# Patient Record
Sex: FEMALE | Race: WHITE | NOT HISPANIC OR LATINO | Employment: PART TIME | URBAN - METROPOLITAN AREA
[De-identification: names, ages, dates, MRNs, and addresses within clinical notes are randomized per-mention and may not be internally consistent; named-entity substitution may affect disease eponyms.]

---

## 2019-11-01 ENCOUNTER — OFFICE VISIT (OUTPATIENT)
Dept: FAMILY MEDICINE CLINIC | Facility: CLINIC | Age: 54
End: 2019-11-01

## 2019-11-01 VITALS
OXYGEN SATURATION: 99 % | HEIGHT: 62 IN | DIASTOLIC BLOOD PRESSURE: 70 MMHG | SYSTOLIC BLOOD PRESSURE: 100 MMHG | WEIGHT: 215 LBS | BODY MASS INDEX: 39.56 KG/M2 | TEMPERATURE: 98.9 F | HEART RATE: 84 BPM

## 2019-11-01 DIAGNOSIS — E66.01 CLASS 2 SEVERE OBESITY DUE TO EXCESS CALORIES WITH SERIOUS COMORBIDITY AND BODY MASS INDEX (BMI) OF 39.0 TO 39.9 IN ADULT (HCC): ICD-10-CM

## 2019-11-01 DIAGNOSIS — J30.2 SEASONAL ALLERGIC RHINITIS, UNSPECIFIED TRIGGER: ICD-10-CM

## 2019-11-01 DIAGNOSIS — Z12.31 ENCOUNTER FOR SCREENING MAMMOGRAM FOR BREAST CANCER: ICD-10-CM

## 2019-11-01 DIAGNOSIS — R42 VERTIGO: Primary | ICD-10-CM

## 2019-11-01 PROCEDURE — 99203 OFFICE O/P NEW LOW 30 MIN: CPT | Performed by: NURSE PRACTITIONER

## 2019-11-01 RX ORDER — FLUTICASONE PROPIONATE 50 MCG
2 SPRAY, SUSPENSION (ML) NASAL DAILY
Qty: 1 BOTTLE | Refills: 5 | Status: SHIPPED | OUTPATIENT
Start: 2019-11-01 | End: 2020-11-06

## 2019-11-01 RX ORDER — MECLIZINE HYDROCHLORIDE 25 MG/1
TABLET ORAL
Refills: 0 | COMMUNITY
Start: 2019-10-21

## 2019-11-01 RX ORDER — MONTELUKAST SODIUM 10 MG/1
10 TABLET ORAL NIGHTLY
COMMUNITY

## 2019-11-01 RX ORDER — OMEPRAZOLE 40 MG/1
40 CAPSULE, DELAYED RELEASE ORAL DAILY
COMMUNITY
End: 2020-10-19 | Stop reason: DRUGHIGH

## 2019-11-01 RX ORDER — METHYLPREDNISOLONE 4 MG/1
TABLET ORAL
Qty: 21 TABLET | Refills: 0 | Status: SHIPPED | OUTPATIENT
Start: 2019-11-01 | End: 2019-11-14 | Stop reason: SDUPTHER

## 2019-11-01 RX ORDER — CETIRIZINE HYDROCHLORIDE, PSEUDOEPHEDRINE HYDROCHLORIDE 5; 120 MG/1; MG/1
1 TABLET, FILM COATED, EXTENDED RELEASE ORAL 2 TIMES DAILY
COMMUNITY

## 2019-11-01 NOTE — PROGRESS NOTES
Subjective   Paz De La Rosa is a 54 y.o. female.     History of Present Illness   Here to est care, prev PCP in [place],   got promotion and moved here, 1 daughter senior at [facility]  With chronic allergies on zyrtec D and singulair 10 mg, recently went back to [place] for daughter's senior pictures and the next day woke up dizziness and nausea went to ER with hx of vertigo, tx IV zofran and fluids and antivert PO, dc on meclizine 25 mg and zofran 4 mg SL, and steroid dose pack improved but continued sinus pressure and pain, using NS and request refill, thinks stress of move c/t sx and no fevers, but sl R ear pain using topical drops, no sore throat cough or SOA but some wheezing, notes this is trigger season for her and has albuterol inhaler and singulair prn, no recent abx, allergic PCN  Has seen derm in past for facial hair on spironolactone 75 mg BID x approx 2 years and stable  Hx of Schatzi's ring and esophageal stenosis s/p esophageal stretching x 2 now on omeprazole 40 mg, UTD colonoscopy    last pap , Last mammo > 1 year no breast pain, lumps, nipple dc  Has prescription for contrave plans to start soon motivated to lose weight to help knee pain and increase exercise    The following portions of the patient's history were reviewed and updated as appropriate: allergies, current medications, past family history, past medical history, past social history, past surgical history and problem list.    Review of Systems   Constitutional: Negative for fever.   HENT: Positive for congestion, postnasal drip, sinus pressure and sinus pain. Negative for dental problem, drooling, ear discharge, ear pain, facial swelling, hearing loss, mouth sores, nosebleeds, rhinorrhea, sneezing, sore throat, tinnitus, trouble swallowing and voice change.    Respiratory: Negative for cough, shortness of breath and wheezing.    Cardiovascular: Negative for chest pain, palpitations and leg swelling.    Musculoskeletal: Positive for arthralgias.   Allergic/Immunologic: Positive for environmental allergies.   Neurological: Positive for dizziness. Negative for headaches.   All other systems reviewed and are negative.      Objective   Physical Exam   Constitutional: She is oriented to person, place, and time. She appears well-developed and well-nourished.   HENT:   Head: Normocephalic and atraumatic.   Right Ear: Hearing normal. A middle ear effusion is present.   Left Ear: Hearing normal. A middle ear effusion is present.   Nose: Mucosal edema present. Right sinus exhibits maxillary sinus tenderness and frontal sinus tenderness. Left sinus exhibits maxillary sinus tenderness and frontal sinus tenderness.   Mouth/Throat: Oropharynx is clear and moist.   Eyes: Conjunctivae and EOM are normal. Pupils are equal, round, and reactive to light.   Neck: Normal range of motion. Neck supple. No thyromegaly present.   Cardiovascular: Normal rate, regular rhythm and normal heart sounds.   Pulmonary/Chest: Effort normal and breath sounds normal.   Musculoskeletal: Normal range of motion.   Lymphadenopathy:     She has no cervical adenopathy.   Neurological: She is alert and oriented to person, place, and time.   Skin: Skin is warm and dry.   Psychiatric: She has a normal mood and affect. Her behavior is normal. Judgment and thought content normal.   Vitals reviewed.      Assessment/Plan   Paz was seen today for establish care and dizziness.    Diagnoses and all orders for this visit:    Vertigo    Seasonal allergic rhinitis, unspecified trigger    Encounter for screening mammogram for breast cancer  -     Mammo Screening Bilateral With CAD; Future    Class 2 severe obesity due to excess calories with serious comorbidity and body mass index (BMI) of 39.0 to 39.9 in adult (CMS/[facility])    Other orders  -     methylPREDNISolone (MEDROL, RAZ,) 4 MG tablet; Take as directed on package instructions.  -     fluticasone (FLONASE) 50  MCG/ACT nasal spray; 2 sprays into the nostril(s) as directed by provider Daily.    awaiting prev PCP and ER records to review, refill medrol dose pack and flonase NS, order overdue mammo defer CBE at this time, Enc healthy diet and regular exercise for wt loss and pt will start contrave and monitor FU 1-2 mo consider saxenda PA prn

## 2019-11-01 NOTE — PATIENT INSTRUCTIONS
awaiting prev PCP and ER records to review, refill medrol dose pack and flonase NS, order overdue mammo defer CBE at this time, Enc healthy diet and regular exercise for wt loss and pt will start contrave and monitor FU 1-2 mo consider saxenda PA prn

## 2019-11-14 ENCOUNTER — TELEPHONE (OUTPATIENT)
Dept: FAMILY MEDICINE CLINIC | Facility: CLINIC | Age: 54
End: 2019-11-14

## 2019-11-14 RX ORDER — METHYLPREDNISOLONE 4 MG/1
TABLET ORAL
Qty: 21 TABLET | Refills: 0 | Status: SHIPPED | OUTPATIENT
Start: 2019-11-14 | End: 2019-11-15 | Stop reason: SDUPTHER

## 2019-11-14 NOTE — TELEPHONE ENCOUNTER
Refill medrol dose pack and cont zofran prn NV and meclizine helps with dizziness. Still waiting for PCP records to review, if sx persist or worsen make FU apt     PATIENT NEEDS A REFILL ON MEDROL DOSE PK. PATIENT HAS GOTTEN VERTIGO THIS MORNING AND IS FLYING TO [place] TOMORROW AND HER HEAD IS HURTING

## 2019-11-15 RX ORDER — METHYLPREDNISOLONE 4 MG/1
TABLET ORAL
Qty: 21 TABLET | Refills: 0 | Status: SHIPPED | OUTPATIENT
Start: 2019-11-15 | End: 2019-12-17

## 2019-12-17 ENCOUNTER — OFFICE VISIT (OUTPATIENT)
Dept: FAMILY MEDICINE CLINIC | Facility: CLINIC | Age: 54
End: 2019-12-17

## 2019-12-17 VITALS
HEIGHT: 62 IN | TEMPERATURE: 97.9 F | HEART RATE: 91 BPM | BODY MASS INDEX: 39.75 KG/M2 | OXYGEN SATURATION: 98 % | SYSTOLIC BLOOD PRESSURE: 118 MMHG | DIASTOLIC BLOOD PRESSURE: 74 MMHG | WEIGHT: 216 LBS

## 2019-12-17 DIAGNOSIS — R42 VERTIGO: Primary | ICD-10-CM

## 2019-12-17 DIAGNOSIS — J30.2 SEASONAL ALLERGIC RHINITIS, UNSPECIFIED TRIGGER: ICD-10-CM

## 2019-12-17 DIAGNOSIS — J45.40 MODERATE PERSISTENT EXTRINSIC ASTHMA WITHOUT COMPLICATION: ICD-10-CM

## 2019-12-17 PROCEDURE — 99213 OFFICE O/P EST LOW 20 MIN: CPT | Performed by: NURSE PRACTITIONER

## 2019-12-17 RX ORDER — PREDNISONE 20 MG/1
TABLET ORAL
Qty: 20 TABLET | Refills: 0 | Status: SHIPPED | OUTPATIENT
Start: 2019-12-17 | End: 2020-01-23

## 2019-12-17 RX ORDER — ALBUTEROL SULFATE 90 UG/1
2 AEROSOL, METERED RESPIRATORY (INHALATION) EVERY 4 HOURS PRN
Qty: 1 INHALER | Refills: 11 | Status: SHIPPED | OUTPATIENT
Start: 2019-12-17

## 2019-12-17 RX ORDER — BUDESONIDE AND FORMOTEROL FUMARATE DIHYDRATE 160; 4.5 UG/1; UG/1
2 AEROSOL RESPIRATORY (INHALATION)
Qty: 1 INHALER | Refills: 11 | Status: SHIPPED | OUTPATIENT
Start: 2019-12-17

## 2019-12-17 NOTE — PROGRESS NOTES
Subjective   Paz De La Rosa is a 54 y.o. female.     History of Present Illness   Here to FU on vertigo still constant but less intense than last seen last month, with chronic allergies on zyrtec D, flonase NS and singulair 10 mg daily, recently traveled to [place] with no sx and returned here with fatigue, sinus congestion, ringing in ears, no fever, no discolored mucus or sore throat, with allergy induced asthma and notes this is trigger season increase albuterol usage, has symbicort request refills prn during season, prev ER eval 10/19 with hx of vertigo, tx IV zofran and fluids and antivert PO, dc on meclizine 25 mg and zofran 4 mg SL, and steroid dose pack improved, with continued positional vertigo HS using meclizine 25 mg HS, allergic PCN, notes prev had similar episode when moved from [place] to [place] and would like recommendation to allergist    The following portions of the patient's history were reviewed and updated as appropriate: allergies, current medications, past family history, past medical history, past social history, past surgical history and problem list.    Review of Systems   Constitutional: Positive for fatigue. Negative for fever.   HENT: Positive for congestion, ear pain, facial swelling, postnasal drip, sinus pressure, sinus pain and tinnitus. Negative for dental problem, drooling, ear discharge, hearing loss, mouth sores, nosebleeds, rhinorrhea, sneezing, sore throat, trouble swallowing and voice change.    Respiratory: Negative for cough, shortness of breath and wheezing.    Cardiovascular: Negative for chest pain, palpitations and leg swelling.   Allergic/Immunologic: Positive for environmental allergies.   Neurological: Positive for dizziness. Negative for tremors, seizures, syncope, facial asymmetry, speech difficulty, weakness, light-headedness, numbness and headaches.   All other systems reviewed and are negative.      Objective   Physical Exam   Constitutional: She is oriented to  person, place, and time. She appears well-developed and well-nourished.   HENT:   Head: Normocephalic and atraumatic.   Right Ear: Hearing normal. A middle ear effusion is present.   Left Ear: Hearing normal. A middle ear effusion is present.   Nose: Mucosal edema present. Right sinus exhibits maxillary sinus tenderness and frontal sinus tenderness. Left sinus exhibits maxillary sinus tenderness and frontal sinus tenderness.   Mouth/Throat: Oropharynx is clear and moist.   Eyes: Pupils are equal, round, and reactive to light. Conjunctivae and EOM are normal.   Neck: Normal range of motion. Neck supple. No thyromegaly present.   Cardiovascular: Normal rate, regular rhythm and normal heart sounds.   Pulmonary/Chest: Effort normal and breath sounds normal.   Musculoskeletal: Normal range of motion.   Lymphadenopathy:     She has no cervical adenopathy.   Neurological: She is alert and oriented to person, place, and time.   Skin: Skin is warm and dry.   Psychiatric: She has a normal mood and affect. Her behavior is normal. Judgment and thought content normal.   Vitals reviewed.      Assessment/Plan   Paz was seen today for dizziness.    Diagnoses and all orders for this visit:    Vertigo    Seasonal allergic rhinitis, unspecified trigger    Moderate persistent extrinsic asthma without complication    Other orders  -     budesonide-formoterol (SYMBICORT) 160-4.5 MCG/ACT inhaler; Inhale 2 puffs 2 (Two) Times a Day. 2 puffs BID  -     albuterol sulfate  (90 Base) MCG/ACT inhaler; Inhale 2 puffs Every 4 (Four) Hours As Needed for Wheezing.  -     predniSONE (DELTASONE) 20 MG tablet; 3 PO x 3 days then 2 PO x 3 days then 1 PO x 3 days then 1/2 PO x 4 days    erx longer steroid taper prednisone 20 mg 3 PO x 3 days, 2 PO x 3 days 1 PO x 3 days and 1/2 PO x 4 days, cont singulair, flonase, zyrtec and meclizine prn, refill symbicort and albuterol and gave referral for allergist, gave coupon symbicort, increase H20 and  rest, call or RTC if sx persist or worsen

## 2019-12-17 NOTE — PATIENT INSTRUCTIONS
erx longer steroid taper prednisone 20 mg 3 PO x 3 days, 2 PO x 3 days 1 PO x 3 days and 1/2 PO x 4 days, cont singulair, flonase, zyrtec and meclizine prn, refill symbicort and albuterol and gave referral for allergist, gave coupon symbicort, increase H20 and rest, call or RTC if sx persist or worsen

## 2020-01-23 ENCOUNTER — HOSPITAL ENCOUNTER (OUTPATIENT)
Dept: CT IMAGING | Facility: HOSPITAL | Age: 55
Discharge: HOME | End: 2020-01-23
Admitting: INTERNAL MEDICINE

## 2020-01-23 ENCOUNTER — OFFICE VISIT (OUTPATIENT)
Dept: FAMILY MEDICINE CLINIC | Facility: CLINIC | Age: 55
End: 2020-01-23

## 2020-01-23 VITALS
SYSTOLIC BLOOD PRESSURE: 108 MMHG | OXYGEN SATURATION: 98 % | HEIGHT: 62 IN | HEART RATE: 92 BPM | BODY MASS INDEX: 39.38 KG/M2 | DIASTOLIC BLOOD PRESSURE: 88 MMHG | WEIGHT: 214 LBS | TEMPERATURE: 97.5 F

## 2020-01-23 DIAGNOSIS — R10.10 PAIN OF UPPER ABDOMEN: Primary | ICD-10-CM

## 2020-01-23 DIAGNOSIS — R10.10 PAIN OF UPPER ABDOMEN: ICD-10-CM

## 2020-01-23 DIAGNOSIS — R30.0 DYSURIA: ICD-10-CM

## 2020-01-23 DIAGNOSIS — R19.7 DIARRHEA, UNSPECIFIED TYPE: ICD-10-CM

## 2020-01-23 LAB
ALBUMIN SERPL-MCNC: 4.9 G/DL (ref 3.5–5.2)
ALBUMIN/GLOB SERPL: 1.7 G/DL
ALP SERPL-CCNC: 112 U/L (ref 39–117)
ALT SERPL W P-5'-P-CCNC: 25 U/L (ref 1–33)
AMYLASE SERPL-CCNC: 22 U/L (ref 28–100)
ANION GAP SERPL CALCULATED.3IONS-SCNC: 15.8 MMOL/L (ref 5–15)
AST SERPL-CCNC: 22 U/L (ref 1–32)
BACTERIA UR QL AUTO: NORMAL /HPF
BILIRUB SERPL-MCNC: 0.4 MG/DL (ref 0.2–1.2)
BILIRUB UR QL STRIP: NEGATIVE
BUN BLD-MCNC: 15 MG/DL (ref 6–20)
BUN/CREAT SERPL: 14.7 (ref 7–25)
CALCIUM SPEC-SCNC: 10.3 MG/DL (ref 8.6–10.5)
CHLORIDE SERPL-SCNC: 94 MMOL/L (ref 98–107)
CLARITY UR: CLEAR
CO2 SERPL-SCNC: 25.2 MMOL/L (ref 22–29)
COLOR UR: YELLOW
CREAT BLD-MCNC: 1.02 MG/DL (ref 0.57–1)
CREAT BLDA-MCNC: 1.1 MG/DL (ref 0.6–1.3)
ERYTHROCYTE [DISTWIDTH] IN BLOOD BY AUTOMATED COUNT: 13.2 % (ref 12.3–15.4)
GFR SERPL CREATININE-BSD FRML MDRD: 56 ML/MIN/1.73
GLOBULIN UR ELPH-MCNC: 2.9 GM/DL
GLUCOSE BLD-MCNC: 100 MG/DL (ref 65–99)
GLUCOSE UR STRIP-MCNC: NEGATIVE MG/DL
HCT VFR BLD AUTO: 47.3 % (ref 34–46.6)
HGB BLD-MCNC: 15.9 G/DL (ref 12–15.9)
HGB UR QL STRIP.AUTO: NEGATIVE
KETONES UR QL STRIP: NEGATIVE
LEUKOCYTE ESTERASE UR QL STRIP.AUTO: NEGATIVE
LIPASE SERPL-CCNC: 27 U/L (ref 13–60)
LYMPHOCYTES # BLD AUTO: 2.4 10*3/MM3 (ref 0.7–3.1)
LYMPHOCYTES NFR BLD AUTO: 31.5 % (ref 19.6–45.3)
MCH RBC QN AUTO: 31.5 PG (ref 26.6–33)
MCHC RBC AUTO-ENTMCNC: 33.7 G/DL (ref 31.5–35.7)
MCV RBC AUTO: 93.6 FL (ref 79–97)
MONOCYTES # BLD AUTO: 0.6 10*3/MM3 (ref 0.1–0.9)
MONOCYTES NFR BLD AUTO: 8.1 % (ref 5–12)
NEUTROPHILS # BLD AUTO: 4.7 10*3/MM3 (ref 1.7–7)
NEUTROPHILS NFR BLD AUTO: 60.4 % (ref 42.7–76)
NITRITE UR QL STRIP: NEGATIVE
PH UR STRIP.AUTO: 5.5 [PH] (ref 4.6–8)
PLATELET # BLD AUTO: 376 10*3/MM3 (ref 140–450)
PMV BLD AUTO: 6.6 FL (ref 6–12)
POTASSIUM BLD-SCNC: 4.7 MMOL/L (ref 3.5–5.2)
PROT SERPL-MCNC: 7.8 G/DL (ref 6–8.5)
PROT UR QL STRIP: NEGATIVE
RBC # BLD AUTO: 5.05 10*6/MM3 (ref 3.77–5.28)
RBC # UR: NORMAL /HPF
REF LAB TEST METHOD: NORMAL
SODIUM BLD-SCNC: 135 MMOL/L (ref 136–145)
SP GR UR STRIP: 1.01 (ref 1–1.03)
SQUAMOUS #/AREA URNS HPF: NORMAL /HPF
UROBILINOGEN UR QL STRIP: NORMAL
WBC NRBC COR # BLD: 7.7 10*3/MM3 (ref 3.4–10.8)
WBC UR QL AUTO: NORMAL /HPF

## 2020-01-23 PROCEDURE — 82150 ASSAY OF AMYLASE: CPT | Performed by: INTERNAL MEDICINE

## 2020-01-23 PROCEDURE — 82565 ASSAY OF CREATININE: CPT

## 2020-01-23 PROCEDURE — 80053 COMPREHEN METABOLIC PANEL: CPT | Performed by: INTERNAL MEDICINE

## 2020-01-23 PROCEDURE — 25010000002 IOPAMIDOL 61 % SOLUTION: Performed by: INTERNAL MEDICINE

## 2020-01-23 PROCEDURE — 81001 URINALYSIS AUTO W/SCOPE: CPT | Performed by: INTERNAL MEDICINE

## 2020-01-23 PROCEDURE — 83690 ASSAY OF LIPASE: CPT | Performed by: INTERNAL MEDICINE

## 2020-01-23 PROCEDURE — 99214 OFFICE O/P EST MOD 30 MIN: CPT | Performed by: INTERNAL MEDICINE

## 2020-01-23 PROCEDURE — 85025 COMPLETE CBC W/AUTO DIFF WBC: CPT | Performed by: INTERNAL MEDICINE

## 2020-01-23 PROCEDURE — 74177 CT ABD & PELVIS W/CONTRAST: CPT

## 2020-01-23 PROCEDURE — 36415 COLL VENOUS BLD VENIPUNCTURE: CPT | Performed by: INTERNAL MEDICINE

## 2020-01-23 RX ORDER — DICYCLOMINE HCL 20 MG
20 TABLET ORAL EVERY 6 HOURS
COMMUNITY
End: 2020-06-17

## 2020-01-23 RX ORDER — CLARITHROMYCIN 500 MG/1
TABLET, COATED ORAL
COMMUNITY
Start: 2020-01-07 | End: 2020-02-14

## 2020-01-23 RX ORDER — ALPRAZOLAM 0.25 MG/1
TABLET ORAL
COMMUNITY
Start: 2019-11-11

## 2020-01-23 RX ADMIN — IOPAMIDOL 85 ML: 612 INJECTION, SOLUTION INTRAVENOUS at 16:43

## 2020-01-23 NOTE — PROGRESS NOTES
Subjective   Paz De La Rosa is a 54 y.o. female.  Patient with abdominal pain abdomen slightly more in the upper abdomen can go into the back also she has a history of IBS felt like it was IBS  There is no height or weight on file to calculate BMI.  History of Present Illness as above abdominal discomfort towards her upper abdomen going around to the back on the right known IBS but the Bentyl is not helping for duration it does help is been some mild diarrhea also is been on Biaxin for infection which can cause abdominal cramping of itself may be exacerbating IBS symptoms has had prior cholecystectomy no known common bile duct stones although still differential personal history of pancreatitis no nausea vomiting is having some loose stools in the setting of being on antibiotics.  Breath hospice being a bladder infection does not have a personal history for kidney stones.  No known stomach ulcer possibly increased IBS symptoms  .  Penicillin causing swelling family is positive for cancer undefined.  Patient's former smoker  Review of Systems   Constitutional: Positive for appetite change and chills.   HENT: Positive for sinus pressure and sinus pain.    Gastrointestinal: Positive for abdominal pain and nausea.   All other systems reviewed and are negative.      Objective   There were no vitals filed for this visit.      Physical Exam   Constitutional: She appears well-developed and well-nourished.   HENT:   Head: Normocephalic and atraumatic.   Eyes: Pupils are equal, round, and reactive to light. Conjunctivae are normal.   Cardiovascular: Normal rate, regular rhythm and normal heart sounds.   Pulmonary/Chest: Effort normal and breath sounds normal.   Abdominal: Soft. Bowel sounds are normal.   Pain with palpation both epigastrium and right upper quadrant no guarding with that but definite pain meter abdomen is benign bowel sounds are unremarkable negative CVA tenderness   Neurological: She is alert.   Unremarkable  gait and station   Skin: Skin is warm and dry.   Nursing note and vitals reviewed.      No results found for: INR    Procedures    Assessment/Plan   1.  Right upper and actually right lower quadrant pain CT of the abdomen done stat today without acute pathology n.p.o. at this point await CT results  If pain does worsen go to ER.  2.  Diarrhea we will get stool for C. difficile to exclude possibility of this being an etiology    3.  Dysuria urine does not show any pyuria observation only for now    Overall await pending lab for potentially further directions to follow    Much of this encounter note is an electronic transcription/translation of spoken language to printed text.  The electronic translation of spoken language may permit erroneous, or at times, nonsensical words or phrases to be inadvertently transcribed.  Although I have reviewed the note for such errors, some may still exist. If there are questions or for further clarification, please contact me.

## 2020-01-27 DIAGNOSIS — R94.8 ABNORMAL PANCREATIC FUNCTION TEST: Primary | ICD-10-CM

## 2020-02-09 ENCOUNTER — APPOINTMENT (OUTPATIENT)
Dept: GENERAL RADIOLOGY | Facility: HOSPITAL | Age: 55
End: 2020-02-09

## 2020-02-09 ENCOUNTER — HOSPITAL ENCOUNTER (EMERGENCY)
Facility: HOSPITAL | Age: 55
Discharge: HOME | End: 2020-02-09
Attending: EMERGENCY MEDICINE | Admitting: EMERGENCY MEDICINE

## 2020-02-09 VITALS
DIASTOLIC BLOOD PRESSURE: 85 MMHG | HEART RATE: 104 BPM | TEMPERATURE: 98.7 F | BODY MASS INDEX: 39.93 KG/M2 | HEIGHT: 62 IN | OXYGEN SATURATION: 97 % | RESPIRATION RATE: 20 BRPM | SYSTOLIC BLOOD PRESSURE: 128 MMHG | WEIGHT: 217 LBS

## 2020-02-09 DIAGNOSIS — J10.1 INFLUENZA B: Primary | ICD-10-CM

## 2020-02-09 DIAGNOSIS — J01.90 ACUTE SINUSITIS, RECURRENCE NOT SPECIFIED, UNSPECIFIED LOCATION: ICD-10-CM

## 2020-02-09 LAB
ALBUMIN SERPL-MCNC: 4.5 G/DL (ref 3.5–5.2)
ALBUMIN/GLOB SERPL: 1.6 G/DL
ALP SERPL-CCNC: 103 U/L (ref 39–117)
ALT SERPL W P-5'-P-CCNC: 22 U/L (ref 1–33)
ANION GAP SERPL CALCULATED.3IONS-SCNC: 14.2 MMOL/L (ref 5–15)
AST SERPL-CCNC: 19 U/L (ref 1–32)
B PARAPERT DNA SPEC QL NAA+PROBE: NOT DETECTED
B PERT DNA SPEC QL NAA+PROBE: NOT DETECTED
BASOPHILS # BLD AUTO: 0.04 10*3/MM3 (ref 0–0.2)
BASOPHILS NFR BLD AUTO: 0.7 % (ref 0–1.5)
BILIRUB SERPL-MCNC: 0.3 MG/DL (ref 0.2–1.2)
BUN BLD-MCNC: 9 MG/DL (ref 6–20)
BUN/CREAT SERPL: 10.8 (ref 7–25)
C PNEUM DNA NPH QL NAA+NON-PROBE: NOT DETECTED
CALCIUM SPEC-SCNC: 9.6 MG/DL (ref 8.6–10.5)
CHLORIDE SERPL-SCNC: 103 MMOL/L (ref 98–107)
CO2 SERPL-SCNC: 24.8 MMOL/L (ref 22–29)
CREAT BLD-MCNC: 0.83 MG/DL (ref 0.57–1)
DEPRECATED RDW RBC AUTO: 44 FL (ref 37–54)
EOSINOPHIL # BLD AUTO: 0.31 10*3/MM3 (ref 0–0.4)
EOSINOPHIL NFR BLD AUTO: 5.6 % (ref 0.3–6.2)
ERYTHROCYTE [DISTWIDTH] IN BLOOD BY AUTOMATED COUNT: 12.6 % (ref 12.3–15.4)
FLUAV H1 2009 PAND RNA NPH QL NAA+PROBE: NOT DETECTED
FLUAV H1 HA GENE NPH QL NAA+PROBE: NOT DETECTED
FLUAV H3 RNA NPH QL NAA+PROBE: NOT DETECTED
FLUAV SUBTYP SPEC NAA+PROBE: NOT DETECTED
FLUBV RNA ISLT QL NAA+PROBE: DETECTED
GFR SERPL CREATININE-BSD FRML MDRD: 71 ML/MIN/1.73
GLOBULIN UR ELPH-MCNC: 2.8 GM/DL
GLUCOSE BLD-MCNC: 89 MG/DL (ref 65–99)
HADV DNA SPEC NAA+PROBE: NOT DETECTED
HCOV 229E RNA SPEC QL NAA+PROBE: NOT DETECTED
HCOV HKU1 RNA SPEC QL NAA+PROBE: NOT DETECTED
HCOV NL63 RNA SPEC QL NAA+PROBE: NOT DETECTED
HCOV OC43 RNA SPEC QL NAA+PROBE: NOT DETECTED
HCT VFR BLD AUTO: 41.8 % (ref 34–46.6)
HGB BLD-MCNC: 14.3 G/DL (ref 12–15.9)
HMPV RNA NPH QL NAA+NON-PROBE: NOT DETECTED
HPIV1 RNA SPEC QL NAA+PROBE: NOT DETECTED
HPIV2 RNA SPEC QL NAA+PROBE: NOT DETECTED
HPIV3 RNA NPH QL NAA+PROBE: NOT DETECTED
HPIV4 P GENE NPH QL NAA+PROBE: NOT DETECTED
IMM GRANULOCYTES # BLD AUTO: 0.01 10*3/MM3 (ref 0–0.05)
IMM GRANULOCYTES NFR BLD AUTO: 0.2 % (ref 0–0.5)
LYMPHOCYTES # BLD AUTO: 0.94 10*3/MM3 (ref 0.7–3.1)
LYMPHOCYTES NFR BLD AUTO: 17.1 % (ref 19.6–45.3)
M PNEUMO IGG SER IA-ACNC: NOT DETECTED
MCH RBC QN AUTO: 32.4 PG (ref 26.6–33)
MCHC RBC AUTO-ENTMCNC: 34.2 G/DL (ref 31.5–35.7)
MCV RBC AUTO: 94.6 FL (ref 79–97)
MONOCYTES # BLD AUTO: 0.51 10*3/MM3 (ref 0.1–0.9)
MONOCYTES NFR BLD AUTO: 9.3 % (ref 5–12)
NEUTROPHILS # BLD AUTO: 3.7 10*3/MM3 (ref 1.7–7)
NEUTROPHILS NFR BLD AUTO: 67.1 % (ref 42.7–76)
NRBC BLD AUTO-RTO: 0 /100 WBC (ref 0–0.2)
PLATELET # BLD AUTO: 230 10*3/MM3 (ref 140–450)
PMV BLD AUTO: 8.5 FL (ref 6–12)
POTASSIUM BLD-SCNC: 4.3 MMOL/L (ref 3.5–5.2)
PROT SERPL-MCNC: 7.3 G/DL (ref 6–8.5)
RBC # BLD AUTO: 4.42 10*6/MM3 (ref 3.77–5.28)
RHINOVIRUS RNA SPEC NAA+PROBE: NOT DETECTED
RSV RNA NPH QL NAA+NON-PROBE: NOT DETECTED
SODIUM BLD-SCNC: 142 MMOL/L (ref 136–145)
WBC NRBC COR # BLD: 5.51 10*3/MM3 (ref 3.4–10.8)

## 2020-02-09 PROCEDURE — 80053 COMPREHEN METABOLIC PANEL: CPT | Performed by: EMERGENCY MEDICINE

## 2020-02-09 PROCEDURE — 85025 COMPLETE CBC W/AUTO DIFF WBC: CPT | Performed by: EMERGENCY MEDICINE

## 2020-02-09 PROCEDURE — 99283 EMERGENCY DEPT VISIT LOW MDM: CPT

## 2020-02-09 PROCEDURE — 94640 AIRWAY INHALATION TREATMENT: CPT

## 2020-02-09 PROCEDURE — 94799 UNLISTED PULMONARY SVC/PX: CPT

## 2020-02-09 PROCEDURE — 71046 X-RAY EXAM CHEST 2 VIEWS: CPT

## 2020-02-09 PROCEDURE — 0100U HC BIOFIRE FILMARRAY RESP PANEL 2: CPT | Performed by: EMERGENCY MEDICINE

## 2020-02-09 RX ORDER — SODIUM CHLORIDE 0.9 % (FLUSH) 0.9 %
10 SYRINGE (ML) INJECTION AS NEEDED
Status: DISCONTINUED | OUTPATIENT
Start: 2020-02-09 | End: 2020-02-10 | Stop reason: HOSPADM

## 2020-02-09 RX ORDER — SULFAMETHOXAZOLE AND TRIMETHOPRIM 800; 160 MG/1; MG/1
1 TABLET ORAL 2 TIMES DAILY
Qty: 14 TABLET | Refills: 0 | Status: SHIPPED | OUTPATIENT
Start: 2020-02-09 | End: 2020-03-27 | Stop reason: SDUPTHER

## 2020-02-09 RX ORDER — GUAIFENESIN AND CODEINE PHOSPHATE 100; 10 MG/5ML; MG/5ML
5 SOLUTION ORAL 3 TIMES DAILY PRN
Qty: 118 ML | Refills: 0 | Status: SHIPPED | OUTPATIENT
Start: 2020-02-09 | End: 2020-03-27

## 2020-02-09 RX ORDER — IPRATROPIUM BROMIDE AND ALBUTEROL SULFATE 2.5; .5 MG/3ML; MG/3ML
3 SOLUTION RESPIRATORY (INHALATION) ONCE
Status: COMPLETED | OUTPATIENT
Start: 2020-02-09 | End: 2020-02-09

## 2020-02-09 RX ORDER — OSELTAMIVIR PHOSPHATE 75 MG/1
75 CAPSULE ORAL 2 TIMES DAILY
Qty: 10 CAPSULE | Refills: 0 | Status: SHIPPED | OUTPATIENT
Start: 2020-02-09 | End: 2020-03-27

## 2020-02-09 RX ADMIN — IPRATROPIUM BROMIDE AND ALBUTEROL SULFATE 3 ML: 2.5; .5 SOLUTION RESPIRATORY (INHALATION) at 19:40

## 2020-02-09 NOTE — ED NOTES
Pt c/o cough, subjective fever, chills, and intermittent chest tightness that began at 0300. Denies heart problems. Recent sinus infection and had 3 rounds of steroids. Hx of asthma.     Katalina Covington, RN  02/09/20 8004

## 2020-02-10 ENCOUNTER — TELEPHONE (OUTPATIENT)
Dept: FAMILY MEDICINE CLINIC | Facility: CLINIC | Age: 55
End: 2020-02-10

## 2020-02-10 NOTE — TELEPHONE ENCOUNTER
I will need to submit office note to get covered, please offer appt tomorrow ok to double book if needed and we can order nebulizer then      PT WENT TO HOSPITAL OVER THE WEEKEND, DX WITH FLU. MADE F/U APPT WITH [facility] THIS Friday, ASKED IF SHE COULD SEND IN RX FOR A NEBULIZER?

## 2020-02-10 NOTE — ED PROVIDER NOTES
EMERGENCY DEPARTMENT ENCOUNTER    CHIEF COMPLAINT  Chief Complaint: SOA  History given by: patient  History limited by: none  Room Number: 28/28  PMD: Shyla Rolle APRN      HPI:  Pt is a 54 y.o. female with Hx of allergy-induced asthma who presents complaining of gradual onset of mild constant SOA that began around 0300 this morning. Pt states that she moved to [place] in August and has since had several bouts of sinus infections with treatment with steroids. Pt affirms dry cough, postnasal drip, intermittent subjective fever, diarrhea, and generalized myalgias but denies nausea and vomiting. Per pt, she has been in contact with her ill daughter, although she states her daughter's Sx are more GI-related. Pt reports she took albuterol and Tylenol PTA with relief of her fever but no relief of SOA.         PAST MEDICAL HISTORY  Active Ambulatory Problems     Diagnosis Date Noted   • Vertigo 11/01/2019     Resolved Ambulatory Problems     Diagnosis Date Noted   • No Resolved Ambulatory Problems     Past Medical History:   Diagnosis Date   • Acid reflux    • Allergic    • IBS (irritable bowel syndrome)    • Plantar fasciitis        PAST SURGICAL HISTORY  Past Surgical History:   Procedure Laterality Date   • CHOLECYSTECTOMY     • OTHER SURGICAL HISTORY Left     thumb joint  replaced   • OVARIAN CYST REMOVAL     • TONSILLECTOMY         FAMILY HISTORY  Family History   Problem Relation Age of Onset   • Cancer Mother    • No Known Problems Father    • No Known Problems Sister    • No Known Problems Brother    • No Known Problems Daughter    • No Known Problems Maternal Grandmother    • No Known Problems Maternal Grandfather    • No Known Problems Paternal Grandmother    • No Known Problems Paternal Grandfather    • No Known Problems Sister    • No Known Problems Daughter        SOCIAL HISTORY  Social History     Socioeconomic History   • Marital status:      Spouse name: Not on file   • Number of children:  Not on file   • Years of education: Not on file   • Highest education level: Not on file   Tobacco Use   • Smoking status: Former Smoker     Last attempt to quit: 1932     Years since quittin.3   • Smokeless tobacco: Never Used   Substance and Sexual Activity   • Alcohol use: Yes     Comment: rarely   • Drug use: No       ALLERGIES  Penicillins    REVIEW OF SYSTEMS  Review of Systems   Constitutional: Positive for fever ( subjective, intermittent).   HENT: Negative for sore throat.    Eyes: Negative.    Respiratory: Positive for cough. Negative for shortness of breath.    Cardiovascular: Negative for chest pain.   Gastrointestinal: Positive for diarrhea. Negative for abdominal pain and vomiting.   Genitourinary: Negative for dysuria.   Musculoskeletal: Positive for myalgias ( generalized). Negative for neck pain.   Skin: Negative for rash.   Allergic/Immunologic: Negative.    Neurological: Negative for weakness, numbness and headaches.   Hematological: Negative.    Psychiatric/Behavioral: Negative.    All other systems reviewed and are negative.      PHYSICAL EXAM  ED Triage Vitals [20 1832]   Temp Heart Rate Resp BP SpO2   98.7 °F (37.1 °C) 114 18 -- 99 %      Temp src Heart Rate Source Patient Position BP Location FiO2 (%)   Tympanic Monitor -- -- --       Physical Exam   Constitutional: She is oriented to person, place, and time. No distress.   HENT:   Head: Normocephalic and atraumatic.   Eyes: Pupils are equal, round, and reactive to light. EOM are normal.   Neck: Normal range of motion. Neck supple.   Cardiovascular: Regular rhythm and normal heart sounds. Tachycardia present.   Pulmonary/Chest: Effort normal. No respiratory distress. She has wheezes ( throughout).   Abdominal: Soft. There is no tenderness. There is no rebound and no guarding.   Musculoskeletal: Normal range of motion. She exhibits no edema.   Neurological: She is alert and oriented to person, place, and time. She has normal  sensation and normal strength.   Skin: Skin is warm and dry. No rash noted.   Psychiatric: Mood and affect normal.   Nursing note and vitals reviewed.      LAB RESULTS  Lab Results (last 24 hours)     ** No results found for the last 24 hours. **          I ordered the above labs and reviewed the results    RADIOLOGY  XR Chest 2 View   Final Result   1. No active disease.       This report was finalized on 2/10/2020 2:35 AM by Param Lacey M.D.               I ordered the above noted radiological studies. Interpreted by radiologist. Discussed with radiologist. Reviewed by me in PACS.       PROCEDURES  Procedures      PROGRESS AND CONSULTS     1923: CXR an labs ordered for further evaluation. Ordered IV fluids for hydration.    2215: Pt rechecked and resting comfortably, in NAD. Informed pt of finding of influenza B. Discussed plan to discharge with Tamiflu Rx. Pt understands and agrees with the plan, all questions answered.      MEDICAL DECISION MAKING  Results were reviewed/discussed with the patient and they were also made aware of online access. Pt also made aware that some labs, such as cultures, will not be resulted during ER visit and follow up with PMD is necessary.     MDM  Number of Diagnoses or Management Options  Influenza B:      Amount and/or Complexity of Data Reviewed  Clinical lab tests: ordered and reviewed (Influenza B positive)  Tests in the radiology section of CPT®: ordered and reviewed (No active disease)           DIAGNOSIS  Final diagnoses:   Influenza B   Acute sinusitis, recurrence not specified, unspecified location       DISPOSITION  DISCHARGE    Patient discharged in stable condition.    Reviewed implications of results, diagnosis, meds, responsibility to follow up, warning signs and symptoms of possible worsening, potential complications and reasons to return to ER.    Patient/Family voiced understanding of above instructions.    Discussed plan for discharge, as there is no emergent  indication for admission. Patient referred to primary care provider for BP management due to today's BP. Pt/family is agreeable and understands need for follow up and repeat testing.  Pt is aware that discharge does not mean that nothing is wrong but it indicates no emergency is present that requires admission and they must continue care with follow-up as given below or physician of their choice.     FOLLOW-UP  Shyla Rolle, APRN  [address] [id number] [phone]    Schedule an appointment as soon as possible for a visit            Medication List      New Prescriptions    guaiFENesin-codeine 100-10 MG/5ML liquid  Commonly known as:  GUAIFENESIN AC  Take 5 mL by mouth 3 (Three) Times a Day As Needed for Cough.     oseltamivir 75 MG capsule  Commonly known as:  TAMIFLU  Take 1 capsule by mouth 2 (Two) Times a Day.     sulfamethoxazole-trimethoprim 800-160 MG per tablet  Commonly known as:  BACTRIM DS  Take 1 tablet by mouth 2 (Two) Times a Day.              Latest Documented Vital Signs:  As of 10:38 PM  BP- 128/85 HR- 104 Temp- 98.7 °F (37.1 °C) (Tympanic) O2 sat- 97%    --  Documentation assistance provided by veronica Molina for Dr. Hunter.  Information recorded by the scribe was done at my direction and has been verified and validated by me.          Sweta Molina  02/09/20 2246       Zion Hunter MD  02/10/20 1842

## 2020-02-14 ENCOUNTER — OFFICE VISIT (OUTPATIENT)
Dept: FAMILY MEDICINE CLINIC | Facility: CLINIC | Age: 55
End: 2020-02-14

## 2020-02-14 ENCOUNTER — TELEPHONE (OUTPATIENT)
Dept: FAMILY MEDICINE CLINIC | Facility: CLINIC | Age: 55
End: 2020-02-14

## 2020-02-14 VITALS
OXYGEN SATURATION: 98 % | HEIGHT: 62 IN | RESPIRATION RATE: 20 BRPM | TEMPERATURE: 97.9 F | BODY MASS INDEX: 39.56 KG/M2 | WEIGHT: 215 LBS | SYSTOLIC BLOOD PRESSURE: 130 MMHG | DIASTOLIC BLOOD PRESSURE: 80 MMHG | HEART RATE: 80 BPM

## 2020-02-14 DIAGNOSIS — J30.2 SEASONAL ALLERGIC RHINITIS, UNSPECIFIED TRIGGER: ICD-10-CM

## 2020-02-14 DIAGNOSIS — J10.1 INFLUENZA B: ICD-10-CM

## 2020-02-14 DIAGNOSIS — J32.9 CHRONIC SINUSITIS, UNSPECIFIED LOCATION: ICD-10-CM

## 2020-02-14 DIAGNOSIS — J45.41 MODERATE PERSISTENT ASTHMA WITH ACUTE EXACERBATION: Primary | ICD-10-CM

## 2020-02-14 DIAGNOSIS — B37.0 THRUSH: ICD-10-CM

## 2020-02-14 PROBLEM — K21.9 GASTROESOPHAGEAL REFLUX DISEASE: Status: ACTIVE | Noted: 2017-04-28

## 2020-02-14 PROCEDURE — 99214 OFFICE O/P EST MOD 30 MIN: CPT | Performed by: NURSE PRACTITIONER

## 2020-02-14 RX ORDER — IPRATROPIUM BROMIDE AND ALBUTEROL SULFATE 2.5; .5 MG/3ML; MG/3ML
3 SOLUTION RESPIRATORY (INHALATION) EVERY 4 HOURS PRN
Qty: 120 ML | Refills: 0 | Status: SHIPPED | OUTPATIENT
Start: 2020-02-14

## 2020-02-14 NOTE — PROGRESS NOTES
Subjective   Paz De La Rosa is a 54 y.o. female.     History of Present Illness   Here to FU on recent [facility] 02/09/20 for SOA and hx of allergy induced asthma on albuterol inhaler with increased usage and daily symbicort, CXR NAD, influenza B positive dc on tamiflu and bactrim for chronic sinusitis and cheratussin for cough, still with prod cough, wheezing worse HS, SOA, chest tightness not pain, frontal HA, no dizziness or B LE edema, with sinus congestion and sore throat, no ear pain, allergic PCN, used to have nebulizer machine and last used in ER last summer, Sees ENT Dr Wiseman with chronic sinusitis and allergies recently had CT sinus on flonase and zyrtec    The following portions of the patient's history were reviewed and updated as appropriate: allergies, current medications, past family history, past medical history, past social history, past surgical history and problem list.    Review of Systems   Constitutional: Positive for fatigue and fever.   HENT: Positive for congestion, facial swelling, postnasal drip, sinus pressure, sinus pain and sore throat. Negative for dental problem, drooling, ear discharge, ear pain, hearing loss, mouth sores, nosebleeds, rhinorrhea, sneezing, tinnitus, trouble swallowing and voice change.    Respiratory: Positive for cough, chest tightness, shortness of breath and wheezing. Negative for apnea, choking and stridor.    Cardiovascular: Negative for chest pain, palpitations and leg swelling.   Gastrointestinal: Negative for diarrhea, nausea and vomiting.   Neurological: Positive for headaches. Negative for dizziness.   All other systems reviewed and are negative.      Objective   Physical Exam   Constitutional: She is oriented to person, place, and time. She appears well-developed and well-nourished.   HENT:   Head: Normocephalic and atraumatic.   Right Ear: Hearing and tympanic membrane normal.   Left Ear: Hearing and tympanic membrane normal.   Nose: Mucosal edema  present. Right sinus exhibits maxillary sinus tenderness and frontal sinus tenderness. Left sinus exhibits maxillary sinus tenderness and frontal sinus tenderness.   Mouth/Throat: Oropharyngeal exudate present.   Eyes: Pupils are equal, round, and reactive to light. Conjunctivae and EOM are normal.   Neck: Normal range of motion. Neck supple. No thyromegaly present.   Cardiovascular: Normal rate, regular rhythm and normal heart sounds.   Pulmonary/Chest: Effort normal. She has wheezes.   Musculoskeletal: Normal range of motion.   Lymphadenopathy:     She has cervical adenopathy.   Neurological: She is alert and oriented to person, place, and time.   Skin: Skin is warm and dry.   Psychiatric: She has a normal mood and affect. Her behavior is normal. Judgment and thought content normal.   Vitals reviewed.      Assessment/Plan   Paz was seen today for cough, shortness of breath, headache and sinusitis.    Diagnoses and all orders for this visit:    Moderate persistent asthma with acute exacerbation    Influenza B    Chronic sinusitis, unspecified location    Seasonal allergic rhinitis, unspecified trigger    Thrush    Other orders  -     nystatin (MYCOSTATIN) [id number] UNIT/ML suspension; Take 5 mL by mouth 4 (Four) Times a Day. 5 mL PO QID x 5 days, retain in mouth as long as possible, use 48 hours after sx resolve  -     ipratropium-albuterol (DUO-NEB) 0.5-2.5 mg/3 ml nebulizer; Take 3 mL by nebulization Every 4 (Four) Hours As Needed for Wheezing. Inhale contents of one vial over 15 minutes every 4-6 hours as needed    reviewed ER records, imaging and labs, cont tamiflu, bactim and cough syrup, cont inhalers and order nebulizer and vials, increase H20 and rest, call or RTC if sx persist or worsen, enc FU with ENT and refer allergist and asthma specialist for possible immunotherapy, erx nystatin swish and spit

## 2020-02-14 NOTE — TELEPHONE ENCOUNTER
Clarified directions on duo neb. Informed her to do 120 vials instead of mL. 120 ml would be 40 vials.

## 2020-02-14 NOTE — TELEPHONE ENCOUNTER
PHARM CALLED IN REGARDS TO GETTING THE CORRECT DIRECTIONS ON THE PATIENTS DUO-NEB MEDICATION. PLEASE ADVISE AND GIVE PHARM A CALL BACK -[phone]

## 2020-02-14 NOTE — PATIENT INSTRUCTIONS
reviewed ER records, imaging and labs, cont tamiflu, bactim and cough syrup, cont inhalers and order nebulizer and vials, increase H20 and rest, call or RTC if sx persist or worsen, enc FU with ENT and refer allergist and asthma specialist for possible immunotherapy, erx nystatin swish and spit

## 2020-03-26 ENCOUNTER — TELEPHONE (OUTPATIENT)
Dept: FAMILY MEDICINE CLINIC | Facility: CLINIC | Age: 55
End: 2020-03-26

## 2020-03-26 NOTE — TELEPHONE ENCOUNTER
Advised patient Shyla not in office.  Advised via vm to go to urgent care and or ER for evaluation and treatment otherwise this message will be addressed by Shyla tomorrow.april

## 2020-03-26 NOTE — TELEPHONE ENCOUNTER
PT CALLED TO HAVE SOME PREDNISONE SENT TO THE PHARMACY OR SOME STEROID TO HELP HER WITH ASTHMA    PHARMACY: [facility] [id number]

## 2020-03-27 ENCOUNTER — OFFICE VISIT (OUTPATIENT)
Dept: FAMILY MEDICINE CLINIC | Facility: CLINIC | Age: 55
End: 2020-03-27

## 2020-03-27 VITALS
WEIGHT: 217 LBS | TEMPERATURE: 98.6 F | SYSTOLIC BLOOD PRESSURE: 118 MMHG | BODY MASS INDEX: 39.93 KG/M2 | DIASTOLIC BLOOD PRESSURE: 82 MMHG | OXYGEN SATURATION: 96 % | HEART RATE: 86 BPM | HEIGHT: 62 IN

## 2020-03-27 DIAGNOSIS — B37.0 THRUSH: ICD-10-CM

## 2020-03-27 DIAGNOSIS — J30.2 SEASONAL ALLERGIC RHINITIS, UNSPECIFIED TRIGGER: ICD-10-CM

## 2020-03-27 DIAGNOSIS — J01.00 ACUTE MAXILLARY SINUSITIS, RECURRENCE NOT SPECIFIED: ICD-10-CM

## 2020-03-27 DIAGNOSIS — J45.41 MODERATE PERSISTENT ASTHMA WITH EXACERBATION: Primary | ICD-10-CM

## 2020-03-27 LAB
ERYTHROCYTE [DISTWIDTH] IN BLOOD BY AUTOMATED COUNT: 13.3 % (ref 12.3–15.4)
HCT VFR BLD AUTO: 40.2 % (ref 34–46.6)
HGB BLD-MCNC: 13.4 G/DL (ref 12–15.9)
LYMPHOCYTES # BLD AUTO: 2.1 10*3/MM3 (ref 0.7–3.1)
LYMPHOCYTES NFR BLD AUTO: 24.9 % (ref 19.6–45.3)
MCH RBC QN AUTO: 30.8 PG (ref 26.6–33)
MCHC RBC AUTO-ENTMCNC: 33.4 G/DL (ref 31.5–35.7)
MCV RBC AUTO: 92.3 FL (ref 79–97)
MONOCYTES # BLD AUTO: 0.6 10*3/MM3 (ref 0.1–0.9)
MONOCYTES NFR BLD AUTO: 7.5 % (ref 5–12)
NEUTROPHILS # BLD AUTO: 5.7 10*3/MM3 (ref 1.7–7)
NEUTROPHILS NFR BLD AUTO: 67.6 % (ref 42.7–76)
PLATELET # BLD AUTO: 359 10*3/MM3 (ref 140–450)
PMV BLD AUTO: 6.4 FL (ref 6–12)
RBC # BLD AUTO: 4.36 10*6/MM3 (ref 3.77–5.28)
WBC NRBC COR # BLD: 8.4 10*3/MM3 (ref 3.4–10.8)

## 2020-03-27 PROCEDURE — 71046 X-RAY EXAM CHEST 2 VIEWS: CPT | Performed by: NURSE PRACTITIONER

## 2020-03-27 PROCEDURE — 36415 COLL VENOUS BLD VENIPUNCTURE: CPT | Performed by: NURSE PRACTITIONER

## 2020-03-27 PROCEDURE — 99214 OFFICE O/P EST MOD 30 MIN: CPT | Performed by: NURSE PRACTITIONER

## 2020-03-27 PROCEDURE — 85025 COMPLETE CBC W/AUTO DIFF WBC: CPT | Performed by: NURSE PRACTITIONER

## 2020-03-27 RX ORDER — SULFAMETHOXAZOLE AND TRIMETHOPRIM 800; 160 MG/1; MG/1
1 TABLET ORAL 2 TIMES DAILY
Qty: 20 TABLET | Refills: 0 | Status: SHIPPED | OUTPATIENT
Start: 2020-03-27 | End: 2020-06-17

## 2020-03-27 RX ORDER — METHYLPREDNISOLONE 4 MG/1
TABLET ORAL
Qty: 21 TABLET | Refills: 0 | Status: SHIPPED | OUTPATIENT
Start: 2020-03-27 | End: 2020-06-17

## 2020-03-27 NOTE — PATIENT INSTRUCTIONS
CXR today NAD awaiting radiology review, CBC WNL, erx medrol dose pack use as directed, erx bactrim DS BID X 10 days, increase H20 and rest, increase nebulizer every 4-6 hrs prn, cont symbicort and FU with ENT and allergist, erx nystatin swish and spit

## 2020-03-27 NOTE — PROGRESS NOTES
Subjective   Paz De La Rosa is a 54 y.o. female.     History of Present Illness   C/o prod cough chest tightness, wheezing and SOA worsening over the last few days, no fevers but sore throat and wondering if from from thrush or PND, with B sinus congestion and R ear pain attributes to chronic sinusitis, With moderate asthma on albuterol and symbicort and using nebulizer twice daily, With chronic allergies on singulair 10 mg, flonase NS, zyrtec D, Sees ENT Dr Wiseman had CT sinus and plans to est with allergist for possible shots, prev seen [facility] 02/09/20 for SOA and hx of allergy induced asthma CXR NAD, influenza B positive dc on tamiflu and bactrim and cheratussin for cough, allergic PCN    The following portions of the patient's history were reviewed and updated as appropriate: allergies, current medications, past family history, past medical history, past social history, past surgical history and problem list.    Review of Systems   Constitutional: Negative for fever.   HENT: Positive for congestion, ear pain, facial swelling, postnasal drip, rhinorrhea, sinus pressure, sinus pain and sore throat. Negative for dental problem, drooling, ear discharge, hearing loss, mouth sores, nosebleeds, sneezing, tinnitus, trouble swallowing and voice change.    Respiratory: Positive for cough, chest tightness, shortness of breath and wheezing. Negative for apnea, choking and stridor.    Cardiovascular: Negative for chest pain, palpitations and leg swelling.   Allergic/Immunologic: Positive for environmental allergies.   Neurological: Negative for dizziness and headaches.   All other systems reviewed and are negative.      Objective   Physical Exam   Constitutional: She is oriented to person, place, and time. She appears well-developed and well-nourished.   HENT:   Head: Normocephalic and atraumatic.   Right Ear: Hearing normal. A middle ear effusion is present.   Left Ear: Hearing normal. A middle ear effusion is present.    Nose: Mucosal edema and septal deviation present. Right sinus exhibits maxillary sinus tenderness and frontal sinus tenderness. Left sinus exhibits maxillary sinus tenderness and frontal sinus tenderness.   Mouth/Throat: Oropharyngeal exudate present.   Eyes: Pupils are equal, round, and reactive to light. Conjunctivae and EOM are normal.   Neck: Normal range of motion. Neck supple. No thyromegaly present.   Cardiovascular: Normal rate, regular rhythm and normal heart sounds.   Pulmonary/Chest: Effort normal. She has wheezes (throughout all lung fields).   Musculoskeletal: Normal range of motion.   Lymphadenopathy:     She has cervical adenopathy.   Neurological: She is alert and oriented to person, place, and time.   Skin: Skin is warm and dry.   Psychiatric: She has a normal mood and affect. Her behavior is normal. Judgment and thought content normal.   Vitals reviewed.  chest xray 2v without comparison for SOA wheezing coughing shows NAD awaiting radiology review    Assessment/Plan   Paz was seen today for med refill.    Diagnoses and all orders for this visit:    Moderate persistent asthma with exacerbation  -     XR Chest PA & Lateral  -     CBC & Differential  -     CBC Auto Differential  -     HYDROcod Polst-CPM Polst ER (Tussionex Pennkinetic ER) 10-8 MG/5ML ER suspension; Take 5 mL by mouth Every 12 (Twelve) Hours As Needed for Cough.    Acute maxillary sinusitis, recurrence not specified    Seasonal allergic rhinitis, unspecified trigger    Thrush    Other orders  -     sulfamethoxazole-trimethoprim (Bactrim DS) 800-160 MG per tablet; Take 1 tablet by mouth 2 (Two) Times a Day.  -     methylPREDNISolone (MEDROL, RAZ,) 4 MG tablet; Take as directed on package instructions.  -     nystatin (MYCOSTATIN) 357727 UNIT/ML suspension; Take 5 mL by mouth 4 (Four) Times a Day. 5 mL PO QID x 5 days, retain in mouth as long as possible, use 48 hours after sx resolve    CXR today NAD awaiting radiology review, CBC  WNL, erx medrol dose pack use as directed, erx bactrim DS BID X 10 days, increase H20 and rest, increase nebulizer every 4-6 hrs prn, cont symbicort and FU with ENT and allergist, erx nystatin swish and spit

## 2020-06-17 ENCOUNTER — OFFICE VISIT (OUTPATIENT)
Dept: FAMILY MEDICINE CLINIC | Facility: CLINIC | Age: 55
End: 2020-06-17

## 2020-06-17 ENCOUNTER — TELEPHONE (OUTPATIENT)
Dept: FAMILY MEDICINE CLINIC | Facility: CLINIC | Age: 55
End: 2020-06-17

## 2020-06-17 VITALS
SYSTOLIC BLOOD PRESSURE: 130 MMHG | HEIGHT: 62 IN | WEIGHT: 229.2 LBS | BODY MASS INDEX: 42.18 KG/M2 | TEMPERATURE: 98.3 F | OXYGEN SATURATION: 96 % | HEART RATE: 85 BPM | DIASTOLIC BLOOD PRESSURE: 80 MMHG
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
## 2020-06-17 DIAGNOSIS — J30.2 SEASONAL ALLERGIC RHINITIS, UNSPECIFIED TRIGGER: ICD-10-CM

## 2020-06-17 DIAGNOSIS — J45.40 MODERATE PERSISTENT ASTHMA WITHOUT COMPLICATION: ICD-10-CM

## 2020-06-17 DIAGNOSIS — E66.01 CLASS 3 SEVERE OBESITY DUE TO EXCESS CALORIES WITH SERIOUS COMORBIDITY AND BODY MASS INDEX (BMI) OF 40.0 TO 44.9 IN ADULT (HCC): ICD-10-CM

## 2020-06-17 DIAGNOSIS — Z23 NEED FOR TDAP VACCINATION: ICD-10-CM

## 2020-06-17 PROCEDURE — 99213 OFFICE O/P EST LOW 20 MIN: CPT | Performed by: NURSE PRACTITIONER

## 2020-06-17 PROCEDURE — 90715 TDAP VACCINE 7 YRS/> IM: CPT | Performed by: NURSE PRACTITIONER

## 2020-06-17 PROCEDURE — 90471 IMMUNIZATION ADMIN: CPT | Performed by: NURSE PRACTITIONER

## 2020-06-17 RX ORDER — TIOTROPIUM BROMIDE INHALATION SPRAY 1.56 UG/1
SPRAY, METERED RESPIRATORY (INHALATION)
COMMUNITY
Start: 2020-05-28

## 2020-06-17 RX ORDER — AZITHROMYCIN 250 MG/1
TABLET, FILM COATED ORAL
Qty: 6 TABLET | Refills: 0 | Status: SHIPPED | OUTPATIENT
Start: 2020-06-17 | End: 2020-10-05

## 2020-06-17 NOTE — TELEPHONE ENCOUNTER
Patient called to see if she can get some antibotics. She was scratch but her cat and was told she needed an tetnus shot and some antibiotics. She stated that the scratch is deep gash and in the fatty tissue.     Sent to [address] [address] [facility] [phone] [facility] [phone] FX.     Please advise [phone].

## 2020-06-17 NOTE — PROGRESS NOTES
Subjective   Paz De La Rosa is a 54 y.o. female.     History of Present Illness   C/o cat scratch last night recently adopted 2 yr rescue cat who is skittish and doesn't have full immunizations and concerned needs tdap updated, negative rabies, has cleaned with alcohol and then soap and water and used ice, no fever, fatigue or enlarged lymph nodes, appears healing well pain 3/10, Allergic PCN  Has seen [facility] Dr Levy for chronic asthma and allergies on albuterol, symbicort and spiriva request sample about to start immunotherapy, on zyrtec D, flonase NS and singulair 10 mg, with weight gain approx 15 lbs since 02/20 attributes to freq steroid use, sedentary with pandemic and poor diet, has prescription of contrave at home plans to start and will work on diet and exercise, no recent bariatrics consult doesn't want at this time    The following portions of the patient's history were reviewed and updated as appropriate: allergies, current medications, past family history, past medical history, past social history, past surgical history and problem list.    Review of Systems   Constitutional: Positive for unexpected weight change. Negative for fever.   HENT: Positive for congestion and postnasal drip. Negative for dental problem, drooling, ear discharge, ear pain, facial swelling, hearing loss, mouth sores, nosebleeds, rhinorrhea, sinus pressure, sinus pain, sneezing, sore throat, tinnitus, trouble swallowing and voice change.    Respiratory: Positive for cough, chest tightness, shortness of breath and wheezing. Negative for apnea, choking and stridor.    Cardiovascular: Negative for chest pain, palpitations and leg swelling.   Skin: Positive for wound.   Allergic/Immunologic: Positive for environmental allergies.   Neurological: Negative for dizziness and headaches.   All other systems reviewed and are negative.      Objective   Physical Exam   Constitutional: She is oriented to person, place, and time. She appears  well-developed and well-nourished.   HENT:   Head: Normocephalic and atraumatic.   Eyes: Pupils are equal, round, and reactive to light. Conjunctivae and EOM are normal.   Cardiovascular: Normal rate, regular rhythm and normal heart sounds.   Pulmonary/Chest: Effort normal and breath sounds normal.   Musculoskeletal: Normal range of motion.   Neurological: She is alert and oriented to person, place, and time.   Skin: Skin is warm and dry. There is erythema (laceration R upper thigh no surrounding erythema or edema, no dc or bleeding).   Psychiatric: She has a normal mood and affect. Her behavior is normal. Judgment and thought content normal.   Vitals reviewed.      Assessment/Plan   Paz was seen today for animal bite.    Diagnoses and all orders for this visit:    Cat scratch    Need for Tdap vaccination    Class 3 severe obesity due to excess calories with serious comorbidity and body mass index (BMI) of 40.0 to 44.9 in adult (CMS/[facility])    Seasonal allergic rhinitis, unspecified trigger    Moderate persistent asthma without complication    Other orders  -     Tdap Vaccine Greater Than or Equal To 8yo IM  -     azithromycin (Zithromax Z-Rylan) 250 MG tablet; Use as directed, For cat scratch    erx zpack use as directed, cont clean with soap and water and use triple abx ointment OTC, tdap today, Enc healthy diet and regular exercise for wt loss pt to start contrave and monitor diet and exercise, defer bariatrics referral today, Gave sample spirva and coupon card cont FU with [facility] for immunotherapy

## 2020-06-17 NOTE — TELEPHONE ENCOUNTER
If she needs tetanus we can work in today as office visit or we can do telephone or video visit and she can get tetanus at pharmacy when she gets her abx filled. Let me know

## 2020-06-17 NOTE — PATIENT INSTRUCTIONS
erx zpack use as directed, cont clean with soap and water and use triple abx ointment OTC, tdap today, Enc healthy diet and regular exercise for wt loss pt to start contrave and monitor diet and exercise, defer bariatrics referral today, Gave sample spirva and coupon card cont FU with [facility] for immunotherapy

## 2020-08-14 DIAGNOSIS — E66.01 MORBID (SEVERE) OBESITY DUE TO EXCESS CALORIES (HCC): Primary | ICD-10-CM

## 2020-10-05 RX ORDER — DICYCLOMINE HCL 20 MG
TABLET ORAL
Qty: 120 TABLET | Refills: 5 | Status: SHIPPED | OUTPATIENT
Start: 2020-10-05 | End: 2021-09-07

## 2020-10-19 RX ORDER — OMEPRAZOLE 20 MG/1
40 CAPSULE, DELAYED RELEASE ORAL DAILY
Qty: 180 CAPSULE | Refills: 3 | Status: SHIPPED | OUTPATIENT
Start: 2020-10-19

## 2020-11-06 RX ORDER — FLUTICASONE PROPIONATE 50 MCG
SPRAY, SUSPENSION (ML) NASAL
Qty: 16 G | Refills: 3 | Status: SHIPPED | OUTPATIENT
Start: 2020-11-06

## 2021-03-30 ENCOUNTER — BULK ORDERING (OUTPATIENT)
Dept: CASE MANAGEMENT | Facility: OTHER | Age: 56
End: 2021-03-30

## 2021-03-30 DIAGNOSIS — Z23 IMMUNIZATION DUE: ICD-10-CM

## 2021-09-07 RX ORDER — DICYCLOMINE HCL 20 MG
TABLET ORAL
Qty: 120 TABLET | Refills: 0 | Status: SHIPPED | OUTPATIENT
Start: 2021-09-07

## 2023-01-27 NOTE — NURSING NOTE
Stat hold & call CT A&P with contrast. Results called to Dr. Wesley cordova for patient to discharge home. IV d/c'd intact no problems or concerns noted at this time.Pt ambulated to main [facility] for discharge.  
Detail Level: Detailed
Quality 431: Preventive Care And Screening: Unhealthy Alcohol Use - Screening: Patient not identified as an unhealthy alcohol user when screened for unhealthy alcohol use using a systematic screening method
Quality 110: Preventive Care And Screening: Influenza Immunization: Influenza Immunization Administered during Influenza season
Quality 226: Preventive Care And Screening: Tobacco Use: Screening And Cessation Intervention: Patient screened for tobacco use and is an ex/non-smoker
Quality 130: Documentation Of Current Medications In The Medical Record: Current Medications Documented

## 2024-03-06 ENCOUNTER — TRANSCRIBE ORDERS (OUTPATIENT)
Dept: ADMINISTRATIVE | Facility: HOSPITAL | Age: 59
End: 2024-03-06
Payer: COMMERCIAL

## 2024-03-06 DIAGNOSIS — Z12.31 VISIT FOR SCREENING MAMMOGRAM: Primary | ICD-10-CM

## 2024-03-15 ENCOUNTER — HOSPITAL ENCOUNTER (OUTPATIENT)
Dept: MAMMOGRAPHY | Facility: HOSPITAL | Age: 59
Discharge: HOME | End: 2024-03-15
Admitting: INTERNAL MEDICINE
Payer: COMMERCIAL

## 2024-03-15 ENCOUNTER — TRANSCRIBE ORDERS (OUTPATIENT)
Dept: ADMINISTRATIVE | Facility: HOSPITAL | Age: 59
End: 2024-03-15
Payer: COMMERCIAL

## 2024-03-15 DIAGNOSIS — Z12.31 VISIT FOR SCREENING MAMMOGRAM: ICD-10-CM

## 2024-03-15 DIAGNOSIS — Z13.6 ENCOUNTER FOR SCREENING FOR VASCULAR DISEASE: Primary | ICD-10-CM

## 2024-03-15 PROCEDURE — 77063 BREAST TOMOSYNTHESIS BI: CPT

## 2024-03-15 PROCEDURE — 77067 SCR MAMMO BI INCL CAD: CPT

## 2024-11-06 ENCOUNTER — TREATMENT (OUTPATIENT)
Dept: PHYSICAL THERAPY | Facility: CLINIC | Age: 59
End: 2024-11-06
Payer: COMMERCIAL

## 2024-11-06 DIAGNOSIS — M54.30 SCIATICA, UNSPECIFIED LATERALITY: ICD-10-CM

## 2024-11-06 DIAGNOSIS — M25.561 PAIN IN BOTH KNEES, UNSPECIFIED CHRONICITY: ICD-10-CM

## 2024-11-06 DIAGNOSIS — M25.562 PAIN IN BOTH KNEES, UNSPECIFIED CHRONICITY: ICD-10-CM

## 2024-11-06 DIAGNOSIS — Z74.09 IMPAIRED FUNCTIONAL MOBILITY AND ACTIVITY TOLERANCE: ICD-10-CM

## 2024-11-06 DIAGNOSIS — M54.40 BILATERAL LOW BACK PAIN WITH SCIATICA, SCIATICA LATERALITY UNSPECIFIED, UNSPECIFIED CHRONICITY: Primary | ICD-10-CM

## 2024-11-06 NOTE — PROGRESS NOTES
[facility]    Physical Therapy Initial Evaluation and Plan of Care    Patient Name: Paz De La Rosa          :  1965  Referring Physician: Duong Gomez MD  Diagnosis: Bilateral low back pain with sciatica, sciatica laterality unspecified, unspecified chronicity [M54.40] ; Pain in both knees, unspecified chronicity [M25.561, M25.562] ; Sciatica, unspecified laterality [M54.30]   Date of Evaluation: 2024  ______________________________________________________________________    Subjective Evaluation    History of Present Illness  Mechanism of injury: Low Back and Knees  LOW BACK:   HNP 10 yrs ago - managing well   PF (B) -gained 60 lbs  PF still a problem -   Boken tail bone -  CMC Jt replacement (L) -   Sitting in car a lot -     KNEE (R) - 4 months ago - Pain walking, stairs, then could not pivot with pain med/lat -     Pain knee joint (medial or laterally) at rest, walking or pivoting - varies -  At one point could not fully extend - due to severe pain-   Noted swelling in quad tendon region (R) and tender - swelling has resolved, but  -     Traveling a lot on airplanes - Massage therapy helpful -     LBP: (B) R>L - entire lumbar spine  - (B) Buttock /LE's into lower legs / Sciatica (B) -   No recent MRI of spine or knee -           PMHX: 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  Pain scale at highest: LB 8/10;  KNEE; 10/10.  Quality: Ache, sharp;  Alleviating factors: Change position; Ice;  Exacerbated by: LB: Sit > 10 min; Stand > 10 min; Stooping; Rising:  KNEE -Walking, twisting, stairs, Squatting, At rest at times;  Symptom course: LB improved Since massage work - Knee No  improvement.    Social Support  Patient lives at: Home w/ stairs -         ___________________________________________________  Objective          Postural Observations    Additional Postural Observation Details  Ant pelvic tilt - Level pelvis -   Mildly antalgic gait - RLE -     Tenderness     Additional Tenderness Details  Tender T10-S1 central and L/R -   Severely tender quad tendon region and apparent defect palpable central quad tendon (R) -  Severely tender medial joint line (R) knee and Pes region -     Active Range of Motion     Additional Active Range of Motion Details  LUMBAR WFL w/ Excessive lumbar extension -   KNEE AROM 0-120 deg     Strength/Myotome Testing     Lumbar   Left   Normal strength    Right Hip   Planes of Motion   Flexion: 4+    Right Knee   Flexion: 4+  Extension: 4 (Painful anterior knee)    Right Ankle/Foot   Dorsiflexion: 5  Plantar flexion: 5    Tests     Additional Tests Details  (-) SLR (B)  (+) Jim's (R) medial knee -   Increased play w/ Ant/post drawer (R) knee, but stable end-range - no pain-  (-) Valgus / varus (R) knee        FUNCTIONAL ACTIVITIES:   TAPING / BRACING: NA  Anatomy / Dysfunction Education  Educated Patient on Aquatic Therapy, its purpose / goals, and how it is beneficial. Review of family changing area, pool, and safety including pool shoes to prevent falls, where to sign in and sit to wait.  Issued Aquatic handout and reviewed w/ Patient   Jt protection, ADL modification; Posture and     Discussed having quad tendon assessed by Ortho in future to rule out possible tear /defect    OSWESTRY: 39 = 78%  KOS: 25/80    ___________________________________________________  Assessment & Plan       Assessment  Impairments: abnormal muscle firing, abnormal muscle tone, abnormal or restricted ROM, activity intolerance, impaired physical strength, lacks appropriate home exercise program, pain with function and weight-bearing intolerance   Functional  limitations: lifting, walking, uncomfortable because of pain, standing and unable to perform repetitive tasks   Assessment details: Paz De La Rosa is a 59 y.o. year-old female referred to physical therapy for back pain, sciatica, knee pain.  She has comorbidities including LBP, chronic Plantar fasciitis, 1st CMC arthroplasty (L) hand  that may affect her progress in the plan of care.  Paz is appropriate for Aquatic Therapy -     Paz would benefit from further assessment of (R) knee (I.e. MRI, etc) to rule out probable medial meniscus tear and assess for possible quad tendon defect (R) -   Prognosis: good    Goals  Plan Goals: Date to achieve goals:  90 days    Date to achieve STGs:  6 weeks    STG 1 Patient will perform aquatic therapy exercises for lumbar , hip, knee, core , and balance, ROM/flexibility, strength and conditioning without increased pain.    STG 2 Patient will demonstrate good postural awareness with standing and walking in pool.    STG 3 Patient will report decreased pain 25% at rest and w/ ADLs -     Date to achieve LTGs:  12 weeks    LTG 1 Patient will demonstrate an independent aquatic HEP for lumbar , knee, core , and balance, strength,  ROM/flexibility, conditioning and balance with community resources     LTG 2 Patient will demonstrate increased core strength, gait, and balance with use of added resistive equipment.    LTG 3 Patient will report decreased functional disability on Modified Oswestry  score from 78% down to < 30% and KOS ADL score from 25/80 to > 60/80.      Plan  Therapy options: will be seen for skilled therapy services  Planned modality interventions: hydrotherapy  Other planned modality interventions: Aquatic therapy  Planned therapy interventions: abdominal trunk stabilization, balance/weight-bearing training, flexibility, functional ROM exercises, strengthening, postural training, neuromuscular re-education, motor coordination training, gait training, home exercise  program, therapeutic activities, stretching and transfer training  Frequency: 2x week  Duration in weeks: 12  Treatment plan discussed with: patient      ___________________________________________________  Manual Therapy:         mins  47588;  Therapeutic Exercise:         mins  46301;     Neuromuscular Christine:        mins  48304;    Therapeutic Activity:     23     mins  03356;   Self Care:                           mins  39065  Ultrasound:          mins  91185;  Iontophoresis:          mins  29382;    Electrical Stimulation:         mins  53844 ( );  Mechanical Traction:          mins  02238  Dry Needling          mins self-pay    Eval:   20   mins    Timed Treatment:   23   mins                  Total Treatment:     43   mins    PT SIGNATURE:     Jatin Myers, PT  DATE TREATMENT INITIATED: 11/6/2024  ___________________________________________________  Initial Certification  Certification Period: 2/4/2025  I certify that the therapy services are furnished while this patient is under my care.  The services outlined above are required by this patient, and will be reviewed every 90 days.     PHYSICIAN: ________________________________  DATE: ______  Duong Gomez MD        Please sign and return via fax to [phone].. Thank you, [facility] Physical Therapy.  ______________________________________________________________________  [address] [place] [address]  Phone: [phone] Fax: [phone]

## 2024-11-07 ENCOUNTER — TREATMENT (OUTPATIENT)
Dept: PHYSICAL THERAPY | Facility: CLINIC | Age: 59
End: 2024-11-07
Payer: COMMERCIAL

## 2024-11-07 DIAGNOSIS — M25.562 PAIN IN BOTH KNEES, UNSPECIFIED CHRONICITY: ICD-10-CM

## 2024-11-07 DIAGNOSIS — Z74.09 IMPAIRED FUNCTIONAL MOBILITY AND ACTIVITY TOLERANCE: ICD-10-CM

## 2024-11-07 DIAGNOSIS — M54.40 BILATERAL LOW BACK PAIN WITH SCIATICA, SCIATICA LATERALITY UNSPECIFIED, UNSPECIFIED CHRONICITY: Primary | ICD-10-CM

## 2024-11-07 DIAGNOSIS — M25.561 PAIN IN BOTH KNEES, UNSPECIFIED CHRONICITY: ICD-10-CM

## 2024-11-07 DIAGNOSIS — M54.30 SCIATICA, UNSPECIFIED LATERALITY: ICD-10-CM

## 2024-11-07 NOTE — PROGRESS NOTES
"Physical Therapy Daily Treatment Note    [facility]  [address] [place] [address]  [phone] (phone)  [phone] (fax)    Patient: Paz De La Rosa   : 1965  Diagnosis/ICD-10 Code:  Bilateral low back pain with sciatica, sciatica laterality unspecified, unspecified chronicity [M54.40]  Referring practitioner: Duong Gomez MD  Date of Initial Visit: Type: THERAPY  Noted: 2024  Today's Date: 2024  Patient seen for 2 sessions             Subjective Evaluation    History of Present Illness    Subjective comment: Pain better than it has been - about 4/10 back > knees this morning, however, knee pain was worse during night last night from assessment.  Goals are to help reduce pain, improve QOL, and sleep better       Objective     AQUATIC EX:    Water Walk   Forward, backward x 3 laps ea - noted a \"little twinge\" in knee trying to stabilize on last backward lap  March Walk  2 laps  Stretch 1   Calf 20 sec x 2 ea  Stretch 2   HS 20 sec x 2 ea  Stretch 3   Piriformis 20 sec x 2 ea  Stretch Other 1  Wall 30 sec x 3  Stretch Other 2  -  Vertical Traction  -  Abdominals   LN x 12  Clams    -  Hip Abd/Add   10x ea  Hip Flex (SLR) 10x ea.  Hip Ext   -  March in Place  15x  Mini Squat   10x  Toe/Heel Raises  -  Uni-Squat   -  Uni-Clock   -  Step Ups   -  Bicycle   2 min, seated  Flutter/Scissor  15 /15, seated  Exercise 1   LAQ + ankle DF x 10 ea  Exercise 2   -  Exercise 3   -  Exercise 4   -  Exercise 5  -  Exercise 6  -    Ended session seated at jets in hot pool x 5 min (supervised)    Assessment & Plan       Assessment  Assessment details: Patient seen today for initial aquatic therapy session including education and instruction in basic aquatic ex/activity for mobility, flexibility, and strength/stabilization.  Instructed her to stand tall, engage abdominals / gluts, and keep ex performance in comfortable range to minimize compensation / strain on spine / " joints.  PT provided demonstration and cuing throughout session for optimal posture, core/glut activation, and correct form/technique with ex/activity.    Plan:  Assess patient response to initial aquatic session and modify/progress as appropriate.                    Timed:  Aquatic Therapy    39     mins 09898;  Therapeutic Activities               mins 36960  Self-Care              mins 38209     Untimed:  Group Therapy                      mins 41724    Total Treatment:        _      _ mins    Henrietta Nuñez, PT  Physical Therapist    [place] License:  [id number]

## 2024-11-15 ENCOUNTER — TREATMENT (OUTPATIENT)
Dept: PHYSICAL THERAPY | Facility: CLINIC | Age: 59
End: 2024-11-15
Payer: COMMERCIAL

## 2024-11-15 DIAGNOSIS — M54.30 SCIATICA, UNSPECIFIED LATERALITY: ICD-10-CM

## 2024-11-15 DIAGNOSIS — Z74.09 IMPAIRED FUNCTIONAL MOBILITY AND ACTIVITY TOLERANCE: ICD-10-CM

## 2024-11-15 DIAGNOSIS — M25.562 PAIN IN BOTH KNEES, UNSPECIFIED CHRONICITY: ICD-10-CM

## 2024-11-15 DIAGNOSIS — M54.40 BILATERAL LOW BACK PAIN WITH SCIATICA, SCIATICA LATERALITY UNSPECIFIED, UNSPECIFIED CHRONICITY: Primary | ICD-10-CM

## 2024-11-15 DIAGNOSIS — M25.561 PAIN IN BOTH KNEES, UNSPECIFIED CHRONICITY: ICD-10-CM

## 2024-11-15 NOTE — PROGRESS NOTES
"Physical Therapy Daily Treatment Note    [facility]  [address] [place] [address]  [phone] (phone)  [phone] (fax)    Patient: Paz De La Rosa   : 1965  Diagnosis/ICD-10 Code:  Bilateral low back pain with sciatica, sciatica laterality unspecified, unspecified chronicity [M54.40]  Referring practitioner: Duong Gomez MD  Date of Initial Visit: Type: THERAPY  Noted: 2024  Today's Date: 11/15/2024  Patient seen for 3 sessions             Subjective   Feels \"the same\".    Objective     AQUATIC EX:     Water Walk      Forwards and Backward 150 ft each  March Walk                 --  Stretch 1                      Calf 20 sec x 2 ea  Stretch 2                      HS w/ LN under ankle 20 sec x 2 ea  Stretch 3                      Piriformis 20 sec x 2 ea  Stretch 4                      BKTC (Wall Crawl) 30 sec x 3  Vertical Traction         3 min.  Abdominals                 LG Noodle Pushdowns x 20  Hip Abd/Add                15x ea  Hip Flex (SLR)            15x ea  Hip Ext                         -  March in Place            10x  Mini Squat                   15x  Toe/Heel Raises          --  Leg Press w/ Lg foam ring X 10 ea  Step Ups                     -  Bicycle                         2 min, seated  Flutter/Scissor             15 /15, seated    Assessment/Plan  Verbal instruction along with demonstration of the prescribed aquatic exercises provided with the addition of Leg Press.  We also added noodle support to enhance the hamstring stretch which patient responded well to.            Timed:  Aquatic Therapy    30     mins 39190;    Therapeutic Activities  _____ mins 44590    Self-Care    _____ mins 76997     Untimed;  Group Therapy           _____ mins 05383    Total Timed:               ______ mins    Silviano Santiago, PT  Physical Therapist    [place] License:  [id number]  "

## 2024-11-26 ENCOUNTER — TREATMENT (OUTPATIENT)
Dept: PHYSICAL THERAPY | Facility: CLINIC | Age: 59
End: 2024-11-26
Payer: COMMERCIAL

## 2024-11-26 DIAGNOSIS — M54.30 SCIATICA, UNSPECIFIED LATERALITY: ICD-10-CM

## 2024-11-26 DIAGNOSIS — M25.561 PAIN IN BOTH KNEES, UNSPECIFIED CHRONICITY: ICD-10-CM

## 2024-11-26 DIAGNOSIS — M25.562 PAIN IN BOTH KNEES, UNSPECIFIED CHRONICITY: ICD-10-CM

## 2024-11-26 DIAGNOSIS — Z74.09 IMPAIRED FUNCTIONAL MOBILITY AND ACTIVITY TOLERANCE: ICD-10-CM

## 2024-11-26 DIAGNOSIS — M54.41 CHRONIC BILATERAL LOW BACK PAIN WITH BILATERAL SCIATICA: Primary | ICD-10-CM

## 2024-11-26 DIAGNOSIS — G89.29 CHRONIC BILATERAL LOW BACK PAIN WITH BILATERAL SCIATICA: Primary | ICD-10-CM

## 2024-11-26 DIAGNOSIS — M54.42 CHRONIC BILATERAL LOW BACK PAIN WITH BILATERAL SCIATICA: Primary | ICD-10-CM

## 2024-11-26 DIAGNOSIS — M54.40 BILATERAL LOW BACK PAIN WITH SCIATICA, SCIATICA LATERALITY UNSPECIFIED, UNSPECIFIED CHRONICITY: ICD-10-CM

## 2024-11-26 NOTE — PROGRESS NOTES
Physical Therapy Daily Treatment Note    [facility]  [address] [place] [address]  [phone] (phone)  [phone] (fax)    Patient: Paz De La Rosa   : 1965  Diagnosis/ICD-10 Code:  Chronic bilateral low back pain with bilateral sciatica [M54.42, M54.41, G89.29]  Referring practitioner: Duong Gomez MD  Date of Initial Visit: Type: THERAPY  Noted: 2024  Today's Date: 2024  Patient seen for 4 sessions             Subjective Evaluation    History of Present Illness    Subjective comment: I've been a little sore, slept better, and had less pain for about a day or so after aquatic sessions but then traveled 4300 miles (on plane) in a week which didn't help.  I'm sure this will help when I can come more consistently.       Objective     AQUATIC EX:     Water Walk                 Forward, backward x 3 laps ea - on own  March Walk                 2 laps  Stretch 1                      Calf 20 sec x 2 ea  Stretch 2                      HS 20-30 sec x 2 ea w/ LN under ankle   Stretch 3                      Piriformis 20 sec x 2 ea  Stretch Other 1           Wall (DKTC) 30 sec x 3 - on own  Stretch Other 2           -  Vertical Traction          LN/rail x 3 min - on own  Abdominals                 LN x 20  Clams                          -  Hip Abd/Add                15x ea  Hip Flex (SLR)            15x ea.  Hip Ext                         -  March in Place            15x -*deferred  Mini Squat                   15x  Toe/Heel Raises         -  Uni-Squat                    -  Leg Press   15x w/ large blue ring  Uni-Clock                    -  Hip Circles cw/ccw 10x ea direction  Step Ups                     -  Bicycle                         2 min, seated - on own  Flutter/Scissor             15 /15, seated  Exercise 1                   LAQ + ankle DF x 10 ea  Exercise 2                   -  Exercise 3                   -  Exercise 4                    -  Exercise 5                   -  Exercise 6                   -     Ended session seated at jets in hot pool x 5 min      Assessment & Plan       Assessment  Assessment details: Patient reports some soreness after last session but overall felt better for a day or so.  Continued with previous aquatic ex/activity for mobility, flexibility, and strength/stabilization.  Added hip circles this visit.  Emphasis on standing tall, engaging abdominals / gluts, and performing ex/ in comfortable range to minimize compensation / strain on spine / joints and optimize benefit.  Demonstration and cuing provided throughout session for optimal posture, core/glut activation, and correct form/technique with ex/activity.    Plan:  Continue to assess patient response to aquatic ex/activity and modify/progress as appropriate.                    Timed:  Aquatic Therapy    26     mins 85580;  Therapeutic Activities               mins 76548  Self-Care              mins 96257     Untimed:  Group Therapy                      mins 32514    Total Treatment:        _      _ mins    Henrietta Nuñez, PT  Physical Therapist    [place] License:  [id number]

## 2024-12-02 ENCOUNTER — TELEPHONE (OUTPATIENT)
Dept: PHYSICAL THERAPY | Facility: CLINIC | Age: 59
End: 2024-12-02

## 2024-12-04 ENCOUNTER — TREATMENT (OUTPATIENT)
Dept: PHYSICAL THERAPY | Facility: CLINIC | Age: 59
End: 2024-12-04
Payer: COMMERCIAL

## 2024-12-04 DIAGNOSIS — M54.42 CHRONIC BILATERAL LOW BACK PAIN WITH BILATERAL SCIATICA: Primary | ICD-10-CM

## 2024-12-04 DIAGNOSIS — G89.29 CHRONIC BILATERAL LOW BACK PAIN WITH BILATERAL SCIATICA: Primary | ICD-10-CM

## 2024-12-04 DIAGNOSIS — M54.40 BILATERAL LOW BACK PAIN WITH SCIATICA, SCIATICA LATERALITY UNSPECIFIED, UNSPECIFIED CHRONICITY: ICD-10-CM

## 2024-12-04 DIAGNOSIS — M54.30 SCIATICA, UNSPECIFIED LATERALITY: ICD-10-CM

## 2024-12-04 DIAGNOSIS — M25.561 PAIN IN BOTH KNEES, UNSPECIFIED CHRONICITY: ICD-10-CM

## 2024-12-04 DIAGNOSIS — Z74.09 IMPAIRED FUNCTIONAL MOBILITY AND ACTIVITY TOLERANCE: ICD-10-CM

## 2024-12-04 DIAGNOSIS — M54.41 CHRONIC BILATERAL LOW BACK PAIN WITH BILATERAL SCIATICA: Primary | ICD-10-CM

## 2024-12-04 DIAGNOSIS — M25.562 PAIN IN BOTH KNEES, UNSPECIFIED CHRONICITY: ICD-10-CM

## 2024-12-04 PROCEDURE — 97113 AQUATIC THERAPY/EXERCISES: CPT | Performed by: PHYSICAL THERAPIST

## 2024-12-04 NOTE — PROGRESS NOTES
Physical Therapy Daily Treatment Note    [facility]  [address] [place] [address]  [phone] (phone)  [phone] (fax)    Patient: Paz De La Rosa   : 1965  Diagnosis/ICD-10 Code:  Chronic bilateral low back pain with bilateral sciatica [M54.42, M54.41, G89.29]  Referring practitioner: Duong Gomez MD  Date of Initial Visit: Type: THERAPY  Noted: 2024  Today's Date: 2024  Patient seen for 5 sessions             Subjective Evaluation    History of Present Illness    Subjective comment: My arches aren't hurting now but the outside of my heels have been hurting with standing / walking - has gotten some better.  My calves are so tight and I think it might be related to the calf stretch.       Objective     AQUATIC EX:     Water Walk                 Forward, backward x 3 laps ea  March Walk                 2 laps  Stretch 1                      single calf stretch (gentle) off edge of step 10-15 sec x 2 ea  Stretch 2                      HS 20-30 sec x 2 ea w/ LN under ankle   Stretch 3                      Piriformis 20 sec x 2 ea  Stretch Other 1           Wall (DKTC) 30 sec x 3   Stretch Other 2           -  Vertical Traction          LN/rail x 3 min  Abdominals                 LN x 20  Clams                          -  Hip Abd/Add                15x ea  Hip Flex (SLR)            15x ea  Hip Ext                         -  March in Place            15x -*deferred  Mini Squat                   15x  Toe/Heel Raises         -  Uni-Squat                    -  Leg Press                    15x w/ large blue ring  Uni-Clock                    -  Hip Circles cw/ccw  10x ea direction  Step Ups                     -  STS from pool bench  -  Bicycle                         2 min, seated  Flutter/Scissor             15 /15, seated  Exercise 1                   LAQ + ankle DF x 12 ea  Exercise 2                   -  Exercise 3                   -  Exercise 4                    -  Exercise 5                   -  Exercise 6                   -      Assessment & Plan       Assessment  Assessment details: Patient reports her arches aren't sore like they were but her heels some soreness after last session but overall felt better for a day or so.  Continued with previous aquatic ex/activity for mobility, flexibility, and strength/stabilization.  Tried longsitting calf stretch with small noodle but she she could not overcome buoyancy of water to keep let down and pull on noodle for stretch so had her try single leg calf stretch off edge of step using UE support and bending stance knee slightly until she felt stretch in calf.  Instructed her control the amount of stretch with knee flexion of WB leg and UE rail support to keep it at a low intensity.  She did note feeling this stretch more in her medial calf.  Focused on standing up tall, actively engaging abdominals / gluts / quads, and performing ex/activity with controlled movement in comfortable range to help reduce compensation / strain on spine / joints and maximize benefit.  Demonstration and cuing provided throughout session for optimal posture, core/glut activation, and correct form/technique with ex/activity.  Discussed possibility of self massage with use of foam roller stick massager, or tools (such as js mims, astym) to work on calf muscles on her own and see if that would help.    Plan:  Continue with skilled therapy progressing ex/activity gradually as appropriate.                    Timed:  Aquatic Therapy    39     mins 16520;  Therapeutic Activities               mins 89006  Self-Care              mins 18941     Untimed:  Group Therapy                      mins 29194    Total Treatment:        _      _ mins    Henrietta Nuñez PT  Physical Therapist    [place] License:  [id number]

## 2024-12-06 ENCOUNTER — TREATMENT (OUTPATIENT)
Dept: PHYSICAL THERAPY | Facility: CLINIC | Age: 59
End: 2024-12-06
Payer: COMMERCIAL

## 2024-12-06 DIAGNOSIS — M25.562 PAIN IN BOTH KNEES, UNSPECIFIED CHRONICITY: ICD-10-CM

## 2024-12-06 DIAGNOSIS — M54.42 CHRONIC BILATERAL LOW BACK PAIN WITH BILATERAL SCIATICA: Primary | ICD-10-CM

## 2024-12-06 DIAGNOSIS — M54.30 SCIATICA, UNSPECIFIED LATERALITY: ICD-10-CM

## 2024-12-06 DIAGNOSIS — M54.41 CHRONIC BILATERAL LOW BACK PAIN WITH BILATERAL SCIATICA: Primary | ICD-10-CM

## 2024-12-06 DIAGNOSIS — M25.561 PAIN IN BOTH KNEES, UNSPECIFIED CHRONICITY: ICD-10-CM

## 2024-12-06 DIAGNOSIS — M54.40 BILATERAL LOW BACK PAIN WITH SCIATICA, SCIATICA LATERALITY UNSPECIFIED, UNSPECIFIED CHRONICITY: ICD-10-CM

## 2024-12-06 DIAGNOSIS — Z74.09 IMPAIRED FUNCTIONAL MOBILITY AND ACTIVITY TOLERANCE: ICD-10-CM

## 2024-12-06 DIAGNOSIS — G89.29 CHRONIC BILATERAL LOW BACK PAIN WITH BILATERAL SCIATICA: Primary | ICD-10-CM

## 2024-12-06 NOTE — PROGRESS NOTES
Physical Therapy Daily Treatment/Reassessment Note    [facility]  [address] [place] [address]  [phone] (phone)  [phone] (fax)    Patient: Paz De La Rosa   : 1965  Diagnosis/ICD-10 Code:  Chronic bilateral low back pain with bilateral sciatica [M54.42, M54.41, G89.29]  Referring practitioner: Duong Gomez MD  Today's Date: 2024  Patient seen for 6 sessions    Visit Diagnoses:    ICD-10-CM ICD-9-CM   1. Chronic bilateral low back pain with bilateral sciatica  M54.42 724.2    M54.41 724.3    G89.29 338.29   2. Pain right knees, unspecified chronicity  M25.561 719.46    M25.562    3. Sciatica, unspecified laterality  M54.30 724.3   4. Impaired functional mobility and activity tolerance  Z74.09 V49.89   5. Bilateral low back pain with sciatica, sciatica laterality unspecified, unspecified chronicity  M54.40 724.3              Subjective : Paz reports that she is having moderate to greater pain in both feet.  She has pain medially and laterally in both, and has pain in the metatarsal area, arches and in her heels.  Pza reports that aqua therapy has improved pain in her low back and knees, slightly.      Objective     Reassessment   Observation:  During the gait cycle, without shoes, each foot over pronated during the gait cycle, R greater than L.  In one leg stance this was increased B.    SLS: L failure at 25s and R failure at 40s    Forefoot flexibility: B 40-45 degrees of passive supination    MMT: ankle inversion B 4 to 4+/5 and ankle eversion B 5/5    FOS:  ARNOLD: 34/50=68% deficit  KOS 29/80=36% or a 64% deficit    Treatment    Therapeutic exercise  Seated towel scrunches x 20, B  2. Seated arch raises x 20, B    NMR: verbal/tactile cues for exercise technique were given for each exercise.  For the arch raise I placed pressure on the first MTP joint to promote that area remaining static with the ground.     Self care: I added the towel  scrunches and arch raises to her HEP    Assessment & Plan       Assessment  Impairments: abnormal gait, abnormal or restricted ROM, activity intolerance, impaired physical strength, lacks appropriate home exercise program, pain with function and weight-bearing intolerance   Functional limitations: carrying objects, lifting, walking, pulling, pushing, uncomfortable because of pain, standing and stooping   Assessment details: Paz De La Rosa has been seen for six physical therapy sessions for chronic bilateral low back pain with bilateral sciatica.  Treatment has included therapeutic exercise, neuro-muscular retraining , aquatic therapy, and patient education with home exercise program . Progress to physical therapy goals is good.  She will benefit from continued skilled physical therapy to address remaining impairments and functional limitations.    Prognosis: good    Goals  Plan Goals: STGs:  6 weeks     STG 1 Patient will perform aquatic therapy exercises for lumbar , hip, knee, core , and balance, ROM/flexibility, strength and conditioning without increased pain. (Met)     STG 2 Patient will demonstrate good postural awareness with standing and walking in pool. (Ongoing)     STG 3 Patient will report decreased pain 25% at rest and w/ ADLs - (ongoing)     Date to achieve LTGs:  12 weeks     LTG 1 Patient will demonstrate an independent aquatic HEP for lumbar , knee, core , and balance, strength,  ROM/flexibility, conditioning and balance with community resources  (ongoing)     LTG 2 Patient will demonstrate increased core strength, gait, and balance with use of added resistive equipment. (Ongoing)     LTG 3 Patient will report decreased functional disability on Modified Oswestry  score from 78% down to < 30% and KOS ADL score from 25/80 to > 60/80. (Ongoing)     LTG 4 Patient will custom orthotics for all weight bearing activities, comfortably, which enables her to perform community walking with no greater pain in  the kinetic chain than 3/10.       Plan  Therapy options: will be seen for skilled therapy services  Other planned modality interventions: aqua therapy/group therapy  Planned therapy interventions: manual therapy, neuromuscular re-education, orthotic fitting/training, strengthening, stretching, therapeutic activities, gait training, functional ROM exercises, flexibility, body mechanics training, balance/weight-bearing training, ADL retraining and abdominal trunk stabilization  Frequency: 1-2 x per week.  Duration in weeks: 4  Treatment plan discussed with: patient  Plan details: Continue per treatment plan.               Timed:    Manual Therapy:         mins  74355;  Therapeutic Exercise:    35     mins  28740;     Neuromuscular Christine:    8    mins  55241;    Therapeutic Activity:          mins  73203;     Gait Training:           mins  70627;     Ultrasound:          mins  03777;    Aquatic Therapy           mins     67873;  Self Care                               mins   23860        Untimed:  Electrical Stimulation:           mins  44769 ( );  Traction:           mins  70866;   Dry Needling  (1-2 muscles)                  mins 08343 (Self-pay)  Dry Needling (3-4 muscles)  ____  20561 (Self-pay)  Dry Needling Trial             DRYNDLTRIAL  (No Charge)    Timed Treatment:   43   mins   Total Treatment:     43   mins    Nba Bui, PT  Physical Therapist    [place] License:[id number]

## 2024-12-12 ENCOUNTER — TREATMENT (OUTPATIENT)
Dept: PHYSICAL THERAPY | Facility: CLINIC | Age: 59
End: 2024-12-12
Payer: COMMERCIAL

## 2024-12-12 DIAGNOSIS — G89.29 CHRONIC BILATERAL LOW BACK PAIN WITH BILATERAL SCIATICA: Primary | ICD-10-CM

## 2024-12-12 DIAGNOSIS — M54.41 CHRONIC BILATERAL LOW BACK PAIN WITH BILATERAL SCIATICA: Primary | ICD-10-CM

## 2024-12-12 DIAGNOSIS — M25.561 PAIN IN BOTH KNEES, UNSPECIFIED CHRONICITY: ICD-10-CM

## 2024-12-12 DIAGNOSIS — Z74.09 IMPAIRED FUNCTIONAL MOBILITY AND ACTIVITY TOLERANCE: ICD-10-CM

## 2024-12-12 DIAGNOSIS — M25.562 PAIN IN BOTH KNEES, UNSPECIFIED CHRONICITY: ICD-10-CM

## 2024-12-12 DIAGNOSIS — M54.42 CHRONIC BILATERAL LOW BACK PAIN WITH BILATERAL SCIATICA: Primary | ICD-10-CM

## 2024-12-12 DIAGNOSIS — M54.40 BILATERAL LOW BACK PAIN WITH SCIATICA, SCIATICA LATERALITY UNSPECIFIED, UNSPECIFIED CHRONICITY: ICD-10-CM

## 2024-12-12 DIAGNOSIS — M54.30 SCIATICA, UNSPECIFIED LATERALITY: ICD-10-CM

## 2024-12-12 NOTE — PROGRESS NOTES
Physical Therapy Daily Treatment Note    [facility]  [address] [place] [address]  [phone] (phone)  [phone] (fax)    Patient: Paz De La Rosa   : 1965  Diagnosis/ICD-10 Code:  Chronic bilateral low back pain with bilateral sciatica [M54.42, M54.41, G89.29]  Referring practitioner: Duong Gomez MD  Date of Initial Visit: Type: THERAPY  Noted: 2024  Today's Date: 2024  Patient seen for 7 sessions             Subjective Evaluation    History of Present Illness    Subjective comment: My back is killing me today - last few days, I've been fairly sedentary.  Been doing a lot of sitting, lying doing school work and that typically aggravates my back.  Pain ~ 7/10 L side SI and R heel.       Objective     Patient observed sitting in poolside chair with upper body shifted R leaning on R chair armrest trying to find a more comfortable position / reduce her back pain    AQUATIC EX:     Water Walk                 Forward, backward x 3 laps ea  March Walk                 2 laps  Stretch 1                      single calf stretch (gentle) off edge of step 10-15 sec x 2 ea - *deferred  Stretch 2                      HS 20-30 sec x 2 ea w/ LN under ankle - *deferred  Stretch 3                      Piriformis 20 sec x 2 ea  Stretch Other 1           Wall (DKTC) 30 sec x 3   Stretch Other 2           modified prayer stretch with noodle forward reach to R - noted good stretch on left but if rotates too far to right causes R sided pain  Seated fig 4 stretch 20 sec x 2 on L (ER bias)  Lateral trunk stretch  Plank position at rail allowing pelvis to sink toward wall 2 x 20 sec (on L)  Vertical Traction          LN/rail x 3 min  Abdominals                 LN x 20  Clams                          -  Hip Abd/Add                15x ea  Hip Flex (SLR)            15x ea  Hip Ext                         -  March in Place            15x -*deferred  Mini Squat                    15x  Toe/Heel Raises         -  Uni-Squat                    -  Leg Press                    2 x 15 w/ large blue ring  Uni-Clock                    -  Hip Circles cw/ccw      10x ea direction  Step Ups                     -  STS from pool bench  -  Bicycle                         2 min, suspended  Flutter/Scissor             1 min / 1 min, suspended  Exercise 1                   LAQ + ankle DF x 12 ea  Exercise 2                   -  Exercise 3                   -  Exercise 4                   -  Exercise 5                   -  Exercise 6                   -      Assessment & Plan       Assessment  Assessment details: Patient reports increased pain in L LB/SI and R heel.  She states she had assessment upstairs for orthotics and hopeful that will help.  Continued with previous aquatic ex/activity for mobility, flexibility, and strength/stabilization.  Tried seated modified prayer stretch, fig 4 stretch, and lateral trunk stretch (side plank position letting pelvis sink toward wall) this visit.  Had her try suspended scissor / flutter, and bicycle this date for greater core activation plus decompression.  Encouraged her to modify / discontinue any ex that causes increased pain during performance.  Emphasis on upright posture, actively engaging abdominals / gluts / quads, and performing ex/activity with good form / technique and control to help reduce compensation / strain on spine / joints and optimize benefit.  Demonstration and cuing provided throughout session for optimal posture, core/glut activation, and correct form/technique with ex/activity.  Informed patient about facility's current stocking  promotion and discussed possibility of facility rehab membership so she could come and walk in pool more often on her own.  Also demonstrated supine cross legged (R leg over L leg) LTR to stretch L lower back as an option she may want to try at home.    Plan:  Continue skilled therapy progressing ex/activity  as appropriate / tolerated.                    Timed:  Aquatic Therapy    42     mins 48043;  Therapeutic Activities               mins 51331  Self-Care              mins 15696     Untimed:  Group Therapy                      mins 36336    Total Treatment:        _      _ mins    Henrietta Nuñez PT  Physical Therapist    [place] License:  [id number]

## 2024-12-23 ENCOUNTER — TELEPHONE (OUTPATIENT)
Dept: PHYSICAL THERAPY | Facility: OTHER | Age: 59
End: 2024-12-23
Payer: COMMERCIAL

## 2024-12-23 NOTE — TELEPHONE ENCOUNTER
Caller: Paz De La Rosa    Relationship: Self    What was the call regarding: PATIENT CANCELLED APPT TODAY DUE TO NOT FEELING WELL

## 2024-12-30 ENCOUNTER — TREATMENT (OUTPATIENT)
Dept: PHYSICAL THERAPY | Facility: CLINIC | Age: 59
End: 2024-12-30
Payer: COMMERCIAL

## 2024-12-30 DIAGNOSIS — M25.562 PAIN IN BOTH KNEES, UNSPECIFIED CHRONICITY: ICD-10-CM

## 2024-12-30 DIAGNOSIS — G89.29 CHRONIC BILATERAL LOW BACK PAIN WITH BILATERAL SCIATICA: Primary | ICD-10-CM

## 2024-12-30 DIAGNOSIS — Z74.09 IMPAIRED FUNCTIONAL MOBILITY AND ACTIVITY TOLERANCE: ICD-10-CM

## 2024-12-30 DIAGNOSIS — M54.30 SCIATICA, UNSPECIFIED LATERALITY: ICD-10-CM

## 2024-12-30 DIAGNOSIS — M54.41 CHRONIC BILATERAL LOW BACK PAIN WITH BILATERAL SCIATICA: Primary | ICD-10-CM

## 2024-12-30 DIAGNOSIS — M54.42 CHRONIC BILATERAL LOW BACK PAIN WITH BILATERAL SCIATICA: Primary | ICD-10-CM

## 2024-12-30 DIAGNOSIS — M25.561 PAIN IN BOTH KNEES, UNSPECIFIED CHRONICITY: ICD-10-CM

## 2024-12-30 PROCEDURE — 97112 NEUROMUSCULAR REEDUCATION: CPT | Performed by: PHYSICAL THERAPIST

## 2024-12-30 PROCEDURE — 97760 ORTHOTIC MGMT&TRAING 1ST ENC: CPT | Performed by: PHYSICAL THERAPIST

## 2024-12-30 NOTE — PROGRESS NOTES
"Physical Therapy Daily Treatment Note    [facility]  [address] [place] [address]  [phone] (phone)  [phone] (fax)    Patient: Paz De La Rosa   : 1965  Diagnosis/ICD-10 Code:  Chronic bilateral low back pain with bilateral sciatica [M54.42, M54.41, G89.29]  Referring practitioner: Duong Gomez MD  Today's Date: 2024  Patient seen for 8 sessions    Visit Diagnoses:    ICD-10-CM ICD-9-CM   1. Chronic bilateral low back pain with bilateral sciatica  M54.42 724.2    M54.41 724.3    G89.29 338.29   2. Pain right knees, unspecified chronicity  M25.561 719.46    M25.562    3. Sciatica, unspecified laterality  M54.30 724.3   4. Impaired functional mobility and activity tolerance  Z74.09 V49.89              Subjective: Paz reports that she stays with her father-in-law every fourth week and usually that triggers issues of pain in her low back, legs and feet.  She is in the mid part of that week currently. 0......    Objective     Forefoot flexibility: B 40 degrees of passive supination    Treatment    Orthotic fit/train: impressions taken for custom made orthotics    NMR: KT taping of the R medial arch to promote supination.  One strip at 100% tension from the metatarsal heads, over the plantar fascia and to the distal Lehighton's tendon.  Two strips at 80% tension, from the lateral foot to over the medial malleolus.  First strip just distal of the calcaneus and the second 1/4\" more distal applied similarly.     Self care: I asked Paz to blow dry the tape after showering/bathing to activate the adhesive and explained that it can stay on her for 3-5 days.     Assessment & Plan       Assessment  Assessment details: Paz has a flexible forefoot that over time should adapt to a semi-rigid orthotic.  The KT tape did relieve some of the pain in her foot/ankle initially after treatment.     Plan  Plan details: Continue per treatment plan, 1-6 more visit. "                Timed:    Manual Therapy:         mins  42938;  Therapeutic Exercise:         mins  70532;     Neuromuscular Christine:    10    mins  48748;    Therapeutic Activity:          mins  24838;     Gait Training:           mins  19945;     Ultrasound:          mins  46608;    Aquatic Therapy           mins     42534;  Self Care                        2       mins   56228  Orthotic fit/train            30 mins 20203      Untimed:  Electrical Stimulation:           mins  03081 ( );  Traction:           mins  23927;   Dry Needling  (1-2 muscles)                  mins 20560 (Self-pay)  Dry Needling (3-4 muscles)  ____  20561 (Self-pay)  Dry Needling Trial             DRYNDLTRIAL  (No Charge)    Timed Treatment:   42   mins   Total Treatment:     42   mins    Nba Bui PT  Physical Therapist    [place] License:[id number]

## 2025-01-09 ENCOUNTER — TELEPHONE (OUTPATIENT)
Dept: PHYSICAL THERAPY | Facility: OTHER | Age: 60
End: 2025-01-09
Payer: COMMERCIAL

## 2025-01-09 NOTE — TELEPHONE ENCOUNTER
Caller: Paz De La Rosa    Relationship: Self    What was the call regarding: PATIENT CANCELLED TODAYS APPT BECAUSE OF THE WEATHER

## 2025-02-18 ENCOUNTER — TELEPHONE (OUTPATIENT)
Dept: PHYSICAL THERAPY | Facility: CLINIC | Age: 60
End: 2025-02-18

## 2025-02-18 NOTE — TELEPHONE ENCOUNTER
Caller: Paz De La Rosa    Relationship: Self         What was the call regarding: WEATHER      ”

## 2025-03-04 ENCOUNTER — TREATMENT (OUTPATIENT)
Dept: PHYSICAL THERAPY | Facility: CLINIC | Age: 60
End: 2025-03-04
Payer: COMMERCIAL

## 2025-03-04 DIAGNOSIS — M79.671 CHRONIC PAIN OF BOTH FEET: ICD-10-CM

## 2025-03-04 DIAGNOSIS — G89.29 CHRONIC PAIN OF BOTH FEET: ICD-10-CM

## 2025-03-04 DIAGNOSIS — M79.672 CHRONIC PAIN OF BOTH FEET: ICD-10-CM

## 2025-03-04 DIAGNOSIS — M54.41 CHRONIC BILATERAL LOW BACK PAIN WITH BILATERAL SCIATICA: Primary | ICD-10-CM

## 2025-03-04 DIAGNOSIS — G89.29 CHRONIC BILATERAL LOW BACK PAIN WITH BILATERAL SCIATICA: Primary | ICD-10-CM

## 2025-03-04 DIAGNOSIS — M54.42 CHRONIC BILATERAL LOW BACK PAIN WITH BILATERAL SCIATICA: Primary | ICD-10-CM

## 2025-03-04 NOTE — PROGRESS NOTES
Physical Therapy Re-certification     [facility]  [address] [place] [address]  [phone] (phone)  [phone] (fax)      Patient: Paz De La Rosa   : 1965  Diagnosis/ICD-10 Code:  Chronic bilateral low back pain with bilateral sciatica [M54.42, M54.41, G89.29]  Referring practitioner: Duong Gomez MD  Date of Initial Visit: Type: THERAPY  Noted: 2024  Today's Date: 3/4/2025  Patient seen for 9 sessions    Visit Diagnoses:    ICD-10-CM ICD-9-CM   1. Chronic bilateral low back pain with bilateral sciatica  M54.42 724.2    M54.41 724.3    G89.29 338.29   2. Chronic pain of both feet  M79.671 729.5    G89.29 338.29    M79.672          Subjective:     Clinical Progress: unchanged  Home Program Compliance: Yes  Treatment has included:  therapeutic exercise, manual therapy, neuro-muscular retraining , and patient education with home exercise program     Subjective: Paz stated that she has been very busy helping with her father-in-law and that has exacerbated her pain.  Paz is adjusting to sleeping in a new bed. She apologizes for cancelling several visits.     Objective     Orthotic check out: in sitting each orthotic was placed under her feet.  While sitting Paz fully plantarflexed each ankle and when I placed the orthotic up, into the foot, a full contact fit was observed, B.  I then trimmed the orthotics to fit her shoes.     Therapeutic exercise  400' of ambulation with the orthotics in her shoes  Runner's stretch, 60s x 1, B    NMR: verbal cues for exercise technique were given.    Self care: I added the runner's stretch to her HEP.  I gave instructions for adapting to the orthotics slowly.  Paz also received education for cleaning the orthotics and other information pertaining to the orthotics and her feet.     Functional Outcome Score: ARNOLD, 35/50=70% deficit and KOS 35/80=56% deficit      Assessment & Plan       Assessment  Impairments:  activity intolerance, impaired physical strength, lacks appropriate home exercise program, pain with function and weight-bearing intolerance   Functional limitations: carrying objects, lifting, walking, pulling, pushing, standing and stooping   Assessment details: Paz De La Rosa has been seen for 9 physical therapy sessions for chronic bilateral low back pain with bilateral sciatica.  Treatment has included therapeutic exercise, manual therapy, neuro-muscular retraining , patient education with home exercise program , and orthotic fabrication . Progress to physical therapy goals is fair.  She will benefit from continued skilled physical therapy to address remaining impairments and functional limitations.    Prognosis: good  Prognosis details: It will take time for Paz's kinetic chain to adapt to the orthotics and hopefully have a positive effect on her low back.     Goals  Plan Goals: STG 1 Patient will perform aquatic therapy exercises for lumbar , hip, knee, core , and balance, ROM/flexibility, strength and conditioning without increased pain. (Met)     STG 2 Patient will demonstrate good postural awareness with standing and walking in pool. (met)     STG 3 Patient will report decreased pain 25% at rest and w/ ADLs - (ongoing)     Date to achieve LTGs:  12 weeks     LTG 1 Patient will demonstrate an independent aquatic HEP for lumbar , knee, core , and balance, strength,  ROM/flexibility, conditioning and balance with community resources  (ongoing)     LTG 2 Patient will demonstrate increased core strength, gait, and balance with use of added resistive equipment. (Ongoing)     LTG 3 Patient will report decreased functional disability on Modified Oswestry  score from 78% down to < 30% and KOS ADL score from 25/80 to > 60/80. (Ongoing)     LTG 4 Patient will custom orthotics for all weight bearing activities, comfortably, which enables her to perform community walking with no greater pain in the kinetic chain than  3/10.     LTG 5: Paz is wearing custom made orthotics for all weight bearing activities comfortably. (New)         Plan  Therapy options: will be seen for skilled therapy services  Planned modality interventions: ultrasound  Planned therapy interventions: manual therapy, orthotic fitting/training, neuromuscular re-education, soft tissue mobilization, strengthening, stretching, therapeutic activities, home exercise program, functional ROM exercises, joint mobilization, body mechanics training, balance/weight-bearing training, ADL retraining and abdominal trunk stabilization  Frequency: 1-4 x per month.  Duration in weeks: 4  Treatment plan discussed with: patient  Plan details: Continue per treatment plan.           Recommendations: Continue as planned  Timeframe: 1 month  Prognosis to achieve goals: good    PT Signature: Nba Bui PT    [place] License Number: [id number]    Electronically signed by Nba Bui PT, 03/04/25, 12:27 PM EST      Based upon review of the patient's progress and continued therapy plan, it is my medical opinion that Paz De La Rosa should continue physical therapy treatment at [facility]  [address] [place] [address] [address] [place] [address] [phone]. office phone  [phone] office fax      Re-certification  Certification Period: 6/2/2025  I certify that the therapy services are furnished while this patient is under my care.  The services outlined above are required by this patient, and will be reviewed every 90 days.     PHYSICIAN: Duong Gomez MD   NPI: [id number]                                         DATE:     Signature: __________________________________  Duong Gomez MD      Please sign and return via fax to [phone]   Thank you, [facility].    Timed:  Manual Therapy:         mins  70122;  Therapeutic Exercise:    5     mins  46421;     Neuromuscular Christine:    1    mins  89933;    Therapeutic Activity:           mins  88585;     Gait Training:             mins  90278;     Ultrasound:            mins  59618;    Orthotic (Initial)                      mins 87685;  Orthotic (follow-up)    25      mins 59686;  Self Care                         10    mins   00936      Untimed:  Electrical Stimulation:            mins  26718 ( );  Traction:           mins  23291;   Dry Needling  (1-2 muscles)       _____  mins 72006 (Self-pay)  Dry Needling (3-4 muscles)            mins 34322 (Self-pay)  Dry Needling Trial           mins DRYNDLTRIAL  (No Charge)  Canalith Repositioning           mins 90535;  Re-eval   _____ min 87130    :  Timed Treatment:   41   mins   Total Treatment:     41   mins

## 2025-04-14 ENCOUNTER — TREATMENT (OUTPATIENT)
Dept: PHYSICAL THERAPY | Facility: CLINIC | Age: 60
End: 2025-04-14
Payer: COMMERCIAL

## 2025-04-14 DIAGNOSIS — G89.29 CHRONIC BILATERAL LOW BACK PAIN WITH BILATERAL SCIATICA: Primary | ICD-10-CM

## 2025-04-14 DIAGNOSIS — G89.29 CHRONIC ANKLE PAIN, BILATERAL: ICD-10-CM

## 2025-04-14 DIAGNOSIS — M25.572 CHRONIC ANKLE PAIN, BILATERAL: ICD-10-CM

## 2025-04-14 DIAGNOSIS — M21.6X2 ACQUIRED FOREFOOT PRONATION, LEFT: ICD-10-CM

## 2025-04-14 DIAGNOSIS — M25.571 CHRONIC ANKLE PAIN, BILATERAL: ICD-10-CM

## 2025-04-14 DIAGNOSIS — M25.562 CHRONIC PAIN OF BOTH KNEES: ICD-10-CM

## 2025-04-14 DIAGNOSIS — M21.6X1 ACQUIRED FOREFOOT PRONATION, RIGHT: ICD-10-CM

## 2025-04-14 DIAGNOSIS — M54.41 CHRONIC BILATERAL LOW BACK PAIN WITH BILATERAL SCIATICA: Primary | ICD-10-CM

## 2025-04-14 DIAGNOSIS — M25.561 CHRONIC PAIN OF BOTH KNEES: ICD-10-CM

## 2025-04-14 DIAGNOSIS — R53.1 WEAKNESS: ICD-10-CM

## 2025-04-14 DIAGNOSIS — M54.42 CHRONIC BILATERAL LOW BACK PAIN WITH BILATERAL SCIATICA: Primary | ICD-10-CM

## 2025-04-14 DIAGNOSIS — G89.29 CHRONIC PAIN OF BOTH KNEES: ICD-10-CM

## 2025-04-14 PROCEDURE — 97110 THERAPEUTIC EXERCISES: CPT | Performed by: PHYSICAL THERAPIST

## 2025-04-14 PROCEDURE — 97112 NEUROMUSCULAR REEDUCATION: CPT | Performed by: PHYSICAL THERAPIST

## 2025-04-14 PROCEDURE — 97140 MANUAL THERAPY 1/> REGIONS: CPT | Performed by: PHYSICAL THERAPIST

## 2025-04-14 NOTE — PROGRESS NOTES
"30-Day / 10-Visit / Re certification Progress Note       [facility]  [address] [place] [address]  [phone] (phone)  [phone] (fax)    Patient: Paz De La Rosa   : 1965  Diagnosis/ICD-10 Code:  Chronic bilateral low back pain with bilateral sciatica [M54.42, M54.41, G89.29]  Referring practitioner: Duong Gomez MD  Date of Initial Visit: Type: THERAPY  Noted: 2024  Today's Date: 2025  Patient seen for 10 sessions      ICD-10-CM ICD-9-CM   1. Chronic bilateral low back pain with bilateral sciatica  M54.42 724.2    M54.41 724.3    G89.29 338.29         Subjective:     Clinical Progress: improved  Home Program Compliance: partially  Treatment has included:  therapeutic exercise, aquatic therapy, patient education with home exercise program , and orthotic fabrication     Subjective: Paz reports that when she wears the orthotics she notices improved function and less pain,  She has not worn them all day, regularly, yet and walked [place] two airports recently and did well with that but had a lot of pain afterwards.     Objective     Reassessment    Motor control: Hip flexion B 4+/5, knee extension B 4+ to 5/5, ankle dorsiflexion B 5/5, EHL L 4+/5, R 4/5 fatiguing, knee flexion L 4 to 4+/5, R 4/5 fatiguing, and hip extension B 4-/5    Functional Outcome Score:   KOS: 27/80=34% or a 66% deficit  ARNOLD: 29/50=58% deficit    Manual therapy;  STM to the B lower leg and multiple trigger points in each lower leg.    NMR: KT taping of the eacj medial arch to promote supination.  One strip at 100% tension from the metatarsal heads, over the plantar fascia and to the distal Erica's tendon.  Two strips at 80% tension, from the lateral foot to over the medial malleolus.  First strip just distal of the calcaneus and the second 1/4\" more distal applied similarly.     Self care: I asked Paz to switch from the [employer] shoes to Ascics to provide less cushion and " improve medial and lateral stability for each foot during weight bearing.  I also stressed the need to wear her orthotics regularly.       Assessment & Plan       Assessment  Impairments: abnormal gait, activity intolerance, impaired physical strength, lacks appropriate home exercise program, pain with function and weight-bearing intolerance   Functional limitations: walking, standing and stooping   Assessment details: Paz De La Rosa has been seen for 10 physical therapy sessions for chronic bilateral low back pain with bilateral sciatica.  Treatment has included therapeutic exercise, manual therapy, neuro-muscular retraining , aquatic therapy, and patient education with home exercise program . Progress to physical therapy goals is fair to good.  She will benefit from continued skilled physical therapy to address remaining impairments and functional limitations.    Prognosis: good    Goals  Plan Goals: TG 1 Patient will perform aquatic therapy exercises for lumbar , hip, knee, core , and balance, ROM/flexibility, strength and conditioning without increased pain. (Met)     STG 2 Patient will demonstrate good postural awareness with standing and walking in pool. (met)     STG 3 Patient will report decreased pain 25% at rest and w/ ADLs - (ongoing)     Date to achieve LTGs:  12 weeks     LTG 1 Patient will demonstrate an independent aquatic HEP for lumbar , knee, core , and balance, strength,  ROM/flexibility, conditioning and balance with community resources  (ongoing)     LTG 2 Patient will demonstrate increased core strength, gait, and balance with use of added resistive equipment. (Ongoing)     LTG 3 Patient will report decreased functional disability on Modified Oswestry  score from 78% down to < 30% and KOS ADL score from 25/80 to > 60/80. (Ongoing)     LTG 4 Patient will custom orthotics for all weight bearing activities, comfortably, which enables her to perform community walking with no greater pain in the  kinetic chain than 3/10.      LTG 5: Paz is wearing custom made orthotics for all weight bearing activities comfortably. (Ongoing)            Plan  Therapy options: will be seen for skilled therapy services  Planned therapy interventions: manual therapy, orthotic fitting/training, neuromuscular re-education, strengthening, stretching, gait training, functional ROM exercises, flexibility, ADL retraining, body mechanics training and transfer training  Frequency: 1-4 x per month.  Duration in weeks: 4  Treatment plan discussed with: patient  Plan details: Continue per treatment plan.            Recommendations: Continue as planned  Timeframe: 1 month  Prognosis to achieve goals: good    PT Signature: Nba Bui PT    [place] License Number: [id number]    Electronically signed by Nba Bui PT, 04/14/25, 4:55 PM EDT      Based upon review of the patient's progress and continued therapy plan, it is my medical opinion that Paz De La Rosa should continue physical therapy treatment at [facility]  [address] [place] [address] [address] [place] [address]  [phone].    Signature: __________________________________  Duong Gomez MD    Timed:  Manual Therapy:    15     mins  55713;  Therapeutic Exercise:    15     mins  38148;     Neuromuscular Christine:    20    mins  25648;    Therapeutic Activity:          mins  60785;     Gait Training:           mins  77892;     Ultrasound:           mins  02285;    Self-Care:                 _____  mins  14236;   Orthotic Fitting (initial)  _____ mins 99632  Orthotic Fitting (follow-up) _____ mins 60777      Untimed:  Electrical Stimulation:           mins  33800 ( );  Traction:           mins  31562;   Dry Needling  (1-2 muscles)         ____  mins 38146 (Self-pay)  Dry Needling (3-4 muscles) ____ mins 08304 (Self-pay)  Dry Needling Trial  ____  mins DRYNDLTRIAL  (No Charge)  Canalith Repositioning  ____ mins 29222        Timed Treatment:   50    mins   Total Treatment:     50   mins

## 2025-05-19 ENCOUNTER — TELEPHONE (OUTPATIENT)
Dept: PHYSICAL THERAPY | Facility: CLINIC | Age: 60
End: 2025-05-19

## 2025-05-19 NOTE — TELEPHONE ENCOUNTER
Caller: Paz De La Rosa    Relationship:  Self    PATIENT CALLED REQUESTING TO CANCEL SAME DAY APPT.    Did the patient call AFTER the start time of their scheduled appointment?  []YES  []NO    Was the patient's appointment rescheduled? []YES  [x]NO    Any additional information: HAS TO BE WITH FATHER IN LAW TODAY

## 2025-06-10 ENCOUNTER — TRANSCRIBE ORDERS (OUTPATIENT)
Dept: ADMINISTRATIVE | Facility: HOSPITAL | Age: 60
End: 2025-06-10
Payer: COMMERCIAL

## 2025-06-10 DIAGNOSIS — Z12.31 ENCOUNTER FOR SCREENING MAMMOGRAM FOR MALIGNANT NEOPLASM OF BREAST: Primary | ICD-10-CM

## 2025-06-17 ENCOUNTER — HOSPITAL ENCOUNTER (OUTPATIENT)
Dept: MAMMOGRAPHY | Facility: HOSPITAL | Age: 60
Discharge: HOME | End: 2025-06-17
Admitting: NURSE PRACTITIONER
Payer: COMMERCIAL

## 2025-06-17 DIAGNOSIS — Z12.31 ENCOUNTER FOR SCREENING MAMMOGRAM FOR MALIGNANT NEOPLASM OF BREAST: ICD-10-CM

## 2025-06-17 PROCEDURE — 77067 SCR MAMMO BI INCL CAD: CPT

## 2025-06-17 PROCEDURE — 77063 BREAST TOMOSYNTHESIS BI: CPT

## 2025-06-19 ENCOUNTER — TRANSCRIBE ORDERS (OUTPATIENT)
Dept: ADMINISTRATIVE | Facility: HOSPITAL | Age: 60
End: 2025-06-19
Payer: COMMERCIAL

## 2025-06-19 DIAGNOSIS — R92.8 ABNORMAL MAMMOGRAM: Primary | ICD-10-CM

## 2025-06-30 ENCOUNTER — HOSPITAL ENCOUNTER (OUTPATIENT)
Dept: MAMMOGRAPHY | Facility: HOSPITAL | Age: 60
Discharge: HOME | End: 2025-06-30
Payer: COMMERCIAL

## 2025-06-30 ENCOUNTER — HOSPITAL ENCOUNTER (OUTPATIENT)
Dept: ULTRASOUND IMAGING | Facility: HOSPITAL | Age: 60
Discharge: HOME | End: 2025-06-30
Payer: COMMERCIAL

## 2025-06-30 DIAGNOSIS — R92.8 ABNORMAL MAMMOGRAM: ICD-10-CM

## 2025-06-30 PROCEDURE — 77065 DX MAMMO INCL CAD UNI: CPT

## 2025-06-30 PROCEDURE — 76642 ULTRASOUND BREAST LIMITED: CPT

## 2025-06-30 PROCEDURE — G0279 TOMOSYNTHESIS, MAMMO: HCPCS

## 2025-07-11 ENCOUNTER — TRANSCRIBE ORDERS (OUTPATIENT)
Dept: ADMINISTRATIVE | Facility: HOSPITAL | Age: 60
End: 2025-07-11
Payer: COMMERCIAL

## 2025-07-11 DIAGNOSIS — M47.26 OTHER SPONDYLOSIS WITH RADICULOPATHY, LUMBAR REGION: Primary | ICD-10-CM

## 2025-08-11 ENCOUNTER — HOSPITAL ENCOUNTER (OUTPATIENT)
Dept: MRI IMAGING | Facility: HOSPITAL | Age: 60
Discharge: HOME | End: 2025-08-11
Payer: COMMERCIAL

## 2025-08-11 DIAGNOSIS — M47.26 OTHER SPONDYLOSIS WITH RADICULOPATHY, LUMBAR REGION: ICD-10-CM

## 2025-08-11 PROCEDURE — 72148 MRI LUMBAR SPINE W/O DYE: CPT
